# Patient Record
Sex: FEMALE | Race: WHITE | Employment: OTHER | URBAN - METROPOLITAN AREA
[De-identification: names, ages, dates, MRNs, and addresses within clinical notes are randomized per-mention and may not be internally consistent; named-entity substitution may affect disease eponyms.]

---

## 2017-01-14 ENCOUNTER — HOSPITAL ENCOUNTER (OUTPATIENT)
Age: 82
Discharge: HOME | End: 2017-01-14
Attending: EMERGENCY MEDICINE
Payer: MEDICARE

## 2017-01-14 VITALS
SYSTOLIC BLOOD PRESSURE: 176 MMHG | TEMPERATURE: 97 F | WEIGHT: 112 LBS | DIASTOLIC BLOOD PRESSURE: 107 MMHG | HEART RATE: 82 BPM | HEIGHT: 57 IN | OXYGEN SATURATION: 97 % | BODY MASS INDEX: 24.16 KG/M2 | RESPIRATION RATE: 16 BRPM

## 2017-01-14 DIAGNOSIS — R04.0 EPISTAXIS: Primary | ICD-10-CM

## 2017-01-14 PROCEDURE — 30901 CONTROL OF NOSEBLEED: CPT

## 2017-01-14 PROCEDURE — 99212 OFFICE O/P EST SF 10 MIN: CPT

## 2017-01-14 NOTE — ED PROVIDER NOTES
Ivey Goltz is a 80year old female who presents for evaluation of a nosebleed  HPI:   Pt complains of a nosebleed which she has had occurring intermittently over the last 2 days.   Patient is uncertain as to what side her nosebleed is on thinks she might GI: denies abdominal pain, vomiting   : denies dysuria  MUSCULOSKELETAL: denies back pain  NEURO: denies headaches  Skin denies painful rash    EXAM:      GENERAL: Patient is awake and alert,in no apparent distress  EYES:PERRL, conjunctiva are clear  E

## 2017-01-14 NOTE — ED INITIAL ASSESSMENT (HPI)
C/O NOSEBLEED THIS AM  S [address] 5-10 MINUTES. PATIENT CURRENTLY ON PRADAXA. NO BLEEDING CURRENTLY NOTED.

## 2017-01-15 ENCOUNTER — HOSPITAL ENCOUNTER (EMERGENCY)
Facility: HOSPITAL | Age: 82
Discharge: HOME | End: 2017-01-15
Attending: EMERGENCY MEDICINE
Payer: MEDICARE

## 2017-01-15 VITALS
RESPIRATION RATE: 16 BRPM | BODY MASS INDEX: 22 KG/M2 | WEIGHT: 100 LBS | SYSTOLIC BLOOD PRESSURE: 176 MMHG | DIASTOLIC BLOOD PRESSURE: 96 MMHG | OXYGEN SATURATION: 96 % | HEART RATE: 86 BPM | TEMPERATURE: 98 F

## 2017-01-15 DIAGNOSIS — R04.0 EPISTAXIS: Primary | ICD-10-CM

## 2017-01-15 PROCEDURE — 99283 EMERGENCY DEPT VISIT LOW MDM: CPT

## 2017-01-15 PROCEDURE — 30903 CONTROL OF NOSEBLEED: CPT

## 2017-01-15 PROCEDURE — A4216 STERILE WATER/SALINE, 10 ML: HCPCS

## 2017-01-15 RX ORDER — CEFADROXIL 500 MG/1
500 CAPSULE ORAL 2 TIMES DAILY
Qty: 10 CAPSULE | Refills: 0 | Status: SHIPPED | OUTPATIENT
Start: 2017-01-15 | End: 2017-01-20

## 2017-01-15 NOTE — ED PROVIDER NOTES
Patient Seen in: [facility] Emergency Department    History   Patient presents with:  Nose Bleed (nasopharyngeal)    Stated Complaint:     HPI    79 yo female on pradaxa with nose bleed. Was seen at [address] [place] [address] earlier and had cautery.  Bleeding started aga elements reviewed from today and agreed except as otherwise stated in HPI.     Physical Exam       ED Triage Vitals   BP 01/15/17 0219 182/82 mmHg   Pulse 01/15/17 0217 83   Resp 01/15/17 0217 16   Temp 01/15/17 0217 97.8 °F (36.6 °C)   Temp src 01/15/17 02

## 2017-01-15 NOTE — ED NOTES
Patient provided with discharge instructions, prescriptions and follow up information. Patient verbalized understanding of instructions without any questions. Patient provided with wheelchair for discharge. Daughter en route to ER to drive patient home.  Pa

## 2017-01-15 NOTE — ED INITIAL ASSESSMENT (HPI)
Pt had nose bleed starting this am, went to [facility] was cauterized and sent home. Nose bleed started again 1 hour ago. Pt is asymptomatic. On blood thinners.

## 2017-01-16 ENCOUNTER — HOSPITAL ENCOUNTER (EMERGENCY)
Facility: HOSPITAL | Age: 82
Discharge: HOME | End: 2017-01-16
Attending: EMERGENCY MEDICINE
Payer: MEDICARE

## 2017-01-16 VITALS
OXYGEN SATURATION: 98 % | RESPIRATION RATE: 17 BRPM | HEART RATE: 83 BPM | WEIGHT: 114 LBS | HEIGHT: 57 IN | BODY MASS INDEX: 24.59 KG/M2 | TEMPERATURE: 98 F | DIASTOLIC BLOOD PRESSURE: 74 MMHG | SYSTOLIC BLOOD PRESSURE: 170 MMHG

## 2017-01-16 DIAGNOSIS — R04.0 EPISTAXIS, RECURRENT: Primary | ICD-10-CM

## 2017-01-16 PROCEDURE — 99283 EMERGENCY DEPT VISIT LOW MDM: CPT

## 2017-01-16 NOTE — ED NOTES
Care assumed. Alert and interactive. Has L nare nasal rocket called PCP for removal today and referred to ED secondary to oozing.  No active bleeding at present,

## 2017-01-16 NOTE — ED NOTES
Discharged home with plan to follow up with ENT as indicated. Alert and interactive. Hemodynamically stable. No active bleeding noted. WC assist to exit.

## 2017-01-16 NOTE — ED INITIAL ASSESSMENT (HPI)
Patient states she has had nosebleed x4 days, states she had her R side cauterized 2 days ago, then at 2 am yesterday she started bleeding, +blood thinners

## 2017-01-19 NOTE — ED PROVIDER NOTES
Patient Seen in: [facility] Emergency Department    History   Patient presents with:  Nose Bleed (nasopharyngeal)    Stated Complaint: Nosebleed, persistent    HPI    80year old female with pmh HTN, hypothyroidism, CVA on Pradaxa who presents for Problem Relation Age of Onset   • Other[other] [OTHER] Father      kidney problems   • Diabetes Mother    • Heart Disorder Mother    • Cancer Mother      colon         Smoking Status: Never Smoker                      Smokeless Status: Never Used Skin: Skin is warm. She is not diaphoretic. No erythema. No pallor. Psychiatric: She has a normal mood and affect. Her behavior is normal.   Nursing note and vitals reviewed.         ED Course   Labs Reviewed - No data to display    MDM       D/w Dr Andersen Scale

## 2017-01-20 ENCOUNTER — OFFICE VISIT (OUTPATIENT)
Dept: OTOLARYNGOLOGY | Facility: CLINIC | Age: 82
End: 2017-01-20

## 2017-01-20 VITALS
DIASTOLIC BLOOD PRESSURE: 70 MMHG | SYSTOLIC BLOOD PRESSURE: 116 MMHG | WEIGHT: 114 LBS | BODY MASS INDEX: 24.59 KG/M2 | TEMPERATURE: 98 F | HEIGHT: 57 IN

## 2017-01-20 DIAGNOSIS — R04.0 EPISTAXIS: Primary | ICD-10-CM

## 2017-01-20 PROCEDURE — 30901 CONTROL OF NOSEBLEED: CPT | Performed by: OTOLARYNGOLOGY

## 2017-01-20 PROCEDURE — 99243 OFF/OP CNSLTJ NEW/EST LOW 30: CPT | Performed by: OTOLARYNGOLOGY

## 2017-01-20 NOTE — PROGRESS NOTES
Sarah Lowe is a 80year old female. Patient presents with: Follow - Up: ED follow up nose bleed- for nose packing removal today      HISTORY OF PRESENT ILLNESS    1/20/2017  Patient prevents for evaluation of nosebleeds. This is not recurrent.  Recent b wheezing. Cardio Negative Chest pain, irregular heartbeat/palpitations and syncope. GI Negative Abdominal pain and diarrhea. Endocrine Negative Cold intolerance and heat intolerance. Neuro Negative Tremors. Psych Negative Anxiety and depression. Cap, Take by mouth., Disp: , Rfl:   •  metoprolol Tartrate 25 MG Oral Tab, Take 1 tablet (25 mg total) by mouth 2 (two) times daily. , Disp: 180 tablet, Rfl: 0  •  Levothyroxine Sodium 50 MCG Oral Tab, Take 1 tablet (50 mcg total) by mouth once daily. , Disp

## 2017-01-22 ENCOUNTER — HOSPITAL ENCOUNTER (EMERGENCY)
Facility: HOSPITAL | Age: 82
Discharge: HOME | End: 2017-01-22
Attending: EMERGENCY MEDICINE
Payer: MEDICARE

## 2017-01-22 ENCOUNTER — APPOINTMENT (OUTPATIENT)
Dept: CT IMAGING | Facility: HOSPITAL | Age: 82
End: 2017-01-22
Attending: EMERGENCY MEDICINE
Payer: MEDICARE

## 2017-01-22 VITALS
WEIGHT: 112 LBS | OXYGEN SATURATION: 99 % | TEMPERATURE: 98 F | RESPIRATION RATE: 18 BRPM | HEART RATE: 82 BPM | SYSTOLIC BLOOD PRESSURE: 165 MMHG | DIASTOLIC BLOOD PRESSURE: 68 MMHG | BODY MASS INDEX: 24.16 KG/M2 | HEIGHT: 57 IN
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
## 2017-01-22 DIAGNOSIS — S09.90XA HEAD INJURY, INITIAL ENCOUNTER: ICD-10-CM

## 2017-01-22 PROCEDURE — 93010 ELECTROCARDIOGRAM REPORT: CPT | Performed by: EMERGENCY MEDICINE

## 2017-01-22 PROCEDURE — 70450 CT HEAD/BRAIN W/O DYE: CPT

## 2017-01-22 PROCEDURE — 99284 EMERGENCY DEPT VISIT MOD MDM: CPT

## 2017-01-22 PROCEDURE — 93005 ELECTROCARDIOGRAM TRACING: CPT

## 2017-01-22 NOTE — ED PROVIDER NOTES
Patient Seen in: [facility] AND [facility] Emergency Department    History   No chief complaint on file. HPI    Patient presents after falling in her bathtub last night and apparently spinning some the night in the bathtub.   She was able to climb out of the Negative for vomiting and abdominal pain. Genitourinary: Negative for dysuria and hematuria. Musculoskeletal: Negative for back pain and neck pain. Skin: Negative for rash and wound. Neurological: Negative for syncope and headaches.        Constitut 5. 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 6.  Minimal pansinusitis. 7.  No significant change from July 16, 2016.          MDM     Pulse Ox: 99%, Room air, Normal     Monitor Interpretation:   normal sinus rhythm    Differential Diagnosis: Fall, head injury, ICH,

## 2017-01-22 NOTE — ED INITIAL ASSESSMENT (HPI)
Pt fell from side of bathtub into tub last night and unknown time, was found by her daughter this AM, on Pradaxa, ambulatory at scene, denies head/neck pain, w/o lower lack pain.

## 2017-06-26 PROCEDURE — 88305 TISSUE EXAM BY PATHOLOGIST: CPT | Performed by: INTERNAL MEDICINE

## 2017-12-20 PROBLEM — I63.81 MULTIPLE LACUNAR INFARCTS (HCC): Status: ACTIVE | Noted: 2017-12-20

## 2018-01-22 ENCOUNTER — HOSPITAL ENCOUNTER (INPATIENT)
Facility: HOSPITAL | Age: 83
LOS: 2 days | Discharge: HOME HEALTH | DRG: 065 | End: 2018-01-24
Attending: EMERGENCY MEDICINE | Admitting: HOSPITALIST
Payer: MEDICARE

## 2018-01-22 ENCOUNTER — APPOINTMENT (OUTPATIENT)
Dept: GENERAL RADIOLOGY | Facility: HOSPITAL | Age: 83
DRG: 065 | End: 2018-01-22
Attending: EMERGENCY MEDICINE
Payer: MEDICARE

## 2018-01-22 ENCOUNTER — APPOINTMENT (OUTPATIENT)
Dept: CT IMAGING | Facility: HOSPITAL | Age: 83
DRG: 065 | End: 2018-01-22
Attending: EMERGENCY MEDICINE
Payer: MEDICARE

## 2018-01-22 DIAGNOSIS — I62.9 INTRACRANIAL HEMORRHAGE (HCC): Primary | ICD-10-CM

## 2018-01-22 DIAGNOSIS — I63.9 ACUTE CVA (CEREBROVASCULAR ACCIDENT) (HCC): ICD-10-CM

## 2018-01-22 DIAGNOSIS — I10 ESSENTIAL HYPERTENSION: ICD-10-CM

## 2018-01-22 LAB
ANION GAP SERPL CALC-SCNC: 10 MMOL/L (ref 0–18)
ANTIBODY SCREEN: NEGATIVE
BASOPHILS # BLD: 0 K/UL (ref 0–0.2)
BASOPHILS NFR BLD: 1 %
BILIRUB UR QL: NEGATIVE
BUN SERPL-MCNC: 25 MG/DL (ref 8–20)
BUN/CREAT SERPL: 24.5 (ref 10–20)
CALCIUM SERPL-MCNC: 9.6 MG/DL (ref 8.5–10.5)
CHLORIDE SERPL-SCNC: 102 MMOL/L (ref 95–110)
CLARITY UR: CLEAR
CO2 SERPL-SCNC: 28 MMOL/L (ref 22–32)
COLOR UR: YELLOW
CREAT SERPL-MCNC: 1.02 MG/DL (ref 0.5–1.5)
EOSINOPHIL # BLD: 0.2 K/UL (ref 0–0.7)
EOSINOPHIL NFR BLD: 3 %
ERYTHROCYTE [DISTWIDTH] IN BLOOD BY AUTOMATED COUNT: 13.3 % (ref 11–15)
GLUCOSE SERPL-MCNC: 125 MG/DL (ref 70–99)
GLUCOSE UR-MCNC: NEGATIVE MG/DL
HCT VFR BLD AUTO: 42.9 % (ref 35–48)
HGB BLD-MCNC: 14.2 G/DL (ref 12–16)
KETONES UR-MCNC: NEGATIVE MG/DL
LEUKOCYTE ESTERASE UR QL STRIP.AUTO: NEGATIVE
LYMPHOCYTES # BLD: 1.4 K/UL (ref 1–4)
LYMPHOCYTES NFR BLD: 20 %
MCH RBC QN AUTO: 32 PG (ref 27–32)
MCHC RBC AUTO-ENTMCNC: 33.2 G/DL (ref 32–37)
MCV RBC AUTO: 96.4 FL (ref 80–100)
MONOCYTES # BLD: 0.7 K/UL (ref 0–1)
MONOCYTES NFR BLD: 9 %
MRSA DNA SPEC QL NAA+PROBE: NEGATIVE
NEUTROPHILS # BLD AUTO: 4.7 K/UL (ref 1.8–7.7)
NEUTROPHILS NFR BLD: 67 %
NITRITE UR QL STRIP.AUTO: NEGATIVE
OSMOLALITY UR CALC.SUM OF ELEC: 296 MOSM/KG (ref 275–295)
PH UR: 7 [PH] (ref 5–8)
PLATELET # BLD AUTO: 175 K/UL (ref 140–400)
PMV BLD AUTO: 9.8 FL (ref 7.4–10.3)
POTASSIUM SERPL-SCNC: 4.3 MMOL/L (ref 3.3–5.1)
PROT UR-MCNC: NEGATIVE MG/DL
RBC # BLD AUTO: 4.45 M/UL (ref 3.7–5.4)
RBC #/AREA URNS AUTO: 1 /HPF
RH BLOOD TYPE: POSITIVE
SODIUM SERPL-SCNC: 140 MMOL/L (ref 136–144)
SP GR UR STRIP: 1 (ref 1–1.03)
UROBILINOGEN UR STRIP-ACNC: <2
VIT C UR-MCNC: NEGATIVE MG/DL
WBC # BLD AUTO: 7 K/UL (ref 4–11)
WBC #/AREA URNS AUTO: 2 /HPF

## 2018-01-22 PROCEDURE — 71045 X-RAY EXAM CHEST 1 VIEW: CPT | Performed by: EMERGENCY MEDICINE

## 2018-01-22 PROCEDURE — 70450 CT HEAD/BRAIN W/O DYE: CPT | Performed by: EMERGENCY MEDICINE

## 2018-01-22 RX ORDER — SODIUM CHLORIDE 0.9 % (FLUSH) 0.9 %
3 SYRINGE (ML) INJECTION AS NEEDED
Status: DISCONTINUED | OUTPATIENT
Start: 2018-01-22 | End: 2018-01-24

## 2018-01-22 RX ORDER — ONDANSETRON 2 MG/ML
4 INJECTION INTRAMUSCULAR; INTRAVENOUS EVERY 6 HOURS PRN
Status: DISCONTINUED | OUTPATIENT
Start: 2018-01-22 | End: 2018-01-24

## 2018-01-22 RX ORDER — ACETAMINOPHEN 325 MG/1
650 TABLET ORAL EVERY 6 HOURS PRN
Status: DISCONTINUED | OUTPATIENT
Start: 2018-01-22 | End: 2018-01-24

## 2018-01-22 NOTE — ED INITIAL ASSESSMENT (HPI)
Patient presents to ER via medics after patient had slurred speech 30 mins PTA - resolved now. Hx TIA. Patient also reports dizziness.

## 2018-01-22 NOTE — PROGRESS NOTES
01/22/18 1652   Clinical Encounter Type   Visited With Family   Routine Visit Introduction   Continue Visiting Yes   Crisis Visit ED   Patient Spiritual Encounters   Spiritual Assessment Completed 2   Family Spiritual Encounters   Family Coping Open/dis

## 2018-01-22 NOTE — PROGRESS NOTES
ASSESSMENT/PLAN:    Impression: 1. Acute hemorrhagic CVA in a 80year-old woman on atrial fibrillation. 2.  Hypertension on metoprolol. 3.  Xarelto: It is unclear why she is on this.     Recommendation: I would stop her Xarelto and all other anticoagu 180 tablet Rfl: 1   Turmeric 450 MG Oral Cap Take by mouth. Disp:  Rfl:    Levothyroxine Sodium 50 MCG Oral Tab Take 1 tablet (50 mcg total) by mouth once daily.  Disp: 90 tablet Rfl: 1   MAGNESIUM 500 MG OR CAPS 1 DAILY Disp:  Rfl:    VITAMIN D 1000 UNIT O SKIN:no rashes,lesions. NEURO/PSYCH:alert and oriented. Normal affect. CV: PALP:PMI not displaced; no lifts, thrills or rubs. AUSC: Irregularly irregular rhythm, normal S1, S2 without S3; no murmur.  CAROTIDS:carotid pulses normal. ABD AORTA:not enlarged

## 2018-01-22 NOTE — ED PROVIDER NOTES
Patient Seen in: [facility] AND [facility] Emergency Department    History   Patient presents with:  Dizziness (neurologic)  Altered Mental Status (neurologic)    Stated Complaint: slurred speech resolved 30 mins PTA; dizziness    HPI    80year old female with Lb        Smoking status: Never Smoker                                                              Smokeless tobacco: Never Used                      Alcohol use:  No                Review of Systems    Positive for stated complaint: slurred speech reso visual field defects. Skin: Skin is warm and dry. No rash noted. She is not diaphoretic. Psychiatric: She has a normal mood and affect. Her speech is normal and behavior is normal.   Nursing note and vitals reviewed.         ED Course     Labs Review intervals and axes as noted on EKG Report.   Rate: 75  Rhythm: Sinus Rhythm  Reading: NSR           ED Course as of Jan 22 1716  ------------------------------------------------------------       MDM       Pulse Ox: 94%, Normal, RA    Cardiac Monitor: Pulse interpreting tests and discussion with consultants but not including time spent performing procedures.     Dr Cesario Alberto - will admit, req cardiology and neurology consult  Dr Elmira Parada - will consult, agrees with bp control, admit ICU for neurochecks, hold xarelt

## 2018-01-23 ENCOUNTER — APPOINTMENT (OUTPATIENT)
Dept: MRI IMAGING | Facility: HOSPITAL | Age: 83
DRG: 065 | End: 2018-01-23
Attending: Other
Payer: MEDICARE

## 2018-01-23 ENCOUNTER — APPOINTMENT (OUTPATIENT)
Dept: ULTRASOUND IMAGING | Facility: HOSPITAL | Age: 83
DRG: 065 | End: 2018-01-23
Attending: Other
Payer: MEDICARE

## 2018-01-23 ENCOUNTER — APPOINTMENT (OUTPATIENT)
Dept: CV DIAGNOSTICS | Facility: HOSPITAL | Age: 83
DRG: 065 | End: 2018-01-23
Attending: INTERNAL MEDICINE
Payer: MEDICARE

## 2018-01-23 LAB
ANION GAP SERPL CALC-SCNC: 6 MMOL/L (ref 0–18)
BASOPHILS # BLD: 0.1 K/UL (ref 0–0.2)
BASOPHILS NFR BLD: 1 %
BUN SERPL-MCNC: 23 MG/DL (ref 8–20)
BUN/CREAT SERPL: 25.8 (ref 10–20)
CALCIUM SERPL-MCNC: 8.8 MG/DL (ref 8.5–10.5)
CHLORIDE SERPL-SCNC: 106 MMOL/L (ref 95–110)
CHOLEST SERPL-MCNC: 182 MG/DL (ref 110–200)
CO2 SERPL-SCNC: 26 MMOL/L (ref 22–32)
CREAT SERPL-MCNC: 0.89 MG/DL (ref 0.5–1.5)
EOSINOPHIL # BLD: 0.3 K/UL (ref 0–0.7)
EOSINOPHIL NFR BLD: 4 %
ERYTHROCYTE [DISTWIDTH] IN BLOOD BY AUTOMATED COUNT: 13.4 % (ref 11–15)
GLUCOSE SERPL-MCNC: 116 MG/DL (ref 70–99)
HCT VFR BLD AUTO: 42.2 % (ref 35–48)
HDLC SERPL-MCNC: 56 MG/DL
HGB BLD-MCNC: 14.1 G/DL (ref 12–16)
LDLC SERPL CALC-MCNC: 105 MG/DL (ref 0–99)
LYMPHOCYTES # BLD: 1.6 K/UL (ref 1–4)
LYMPHOCYTES NFR BLD: 19 %
MCH RBC QN AUTO: 32 PG (ref 27–32)
MCHC RBC AUTO-ENTMCNC: 33.5 G/DL (ref 32–37)
MCV RBC AUTO: 95.6 FL (ref 80–100)
MONOCYTES # BLD: 0.8 K/UL (ref 0–1)
MONOCYTES NFR BLD: 9 %
NEUTROPHILS # BLD AUTO: 5.7 K/UL (ref 1.8–7.7)
NEUTROPHILS NFR BLD: 67 %
NONHDLC SERPL-MCNC: 126 MG/DL
OSMOLALITY UR CALC.SUM OF ELEC: 291 MOSM/KG (ref 275–295)
PLATELET # BLD AUTO: 189 K/UL (ref 140–400)
PMV BLD AUTO: 10.2 FL (ref 7.4–10.3)
POTASSIUM SERPL-SCNC: 3.5 MMOL/L (ref 3.3–5.1)
POTASSIUM SERPL-SCNC: 5.5 MMOL/L (ref 3.3–5.1)
RBC # BLD AUTO: 4.41 M/UL (ref 3.7–5.4)
SODIUM SERPL-SCNC: 138 MMOL/L (ref 136–144)
TRIGL SERPL-MCNC: 103 MG/DL (ref 1–149)
WBC # BLD AUTO: 8.5 K/UL (ref 4–11)

## 2018-01-23 PROCEDURE — 95816 EEG AWAKE AND DROWSY: CPT | Performed by: OTHER

## 2018-01-23 PROCEDURE — 4A00X4Z MEASUREMENT OF CENTRAL NERVOUS ELECTRICAL ACTIVITY, EXTERNAL APPROACH: ICD-10-PCS | Performed by: INTERNAL MEDICINE

## 2018-01-23 PROCEDURE — 93880 EXTRACRANIAL BILAT STUDY: CPT | Performed by: OTHER

## 2018-01-23 PROCEDURE — 70551 MRI BRAIN STEM W/O DYE: CPT | Performed by: OTHER

## 2018-01-23 PROCEDURE — 93306 TTE W/DOPPLER COMPLETE: CPT | Performed by: INTERNAL MEDICINE

## 2018-01-23 PROCEDURE — 99223 1ST HOSP IP/OBS HIGH 75: CPT | Performed by: OTHER

## 2018-01-23 RX ORDER — ARIPIPRAZOLE 15 MG/1
40 TABLET ORAL EVERY 4 HOURS
Status: COMPLETED | OUTPATIENT
Start: 2018-01-23 | End: 2018-01-23

## 2018-01-23 RX ORDER — HYDRALAZINE HYDROCHLORIDE 25 MG/1
25 TABLET, FILM COATED ORAL EVERY 6 HOURS PRN
Status: DISCONTINUED | OUTPATIENT
Start: 2018-01-23 | End: 2018-01-24

## 2018-01-23 RX ORDER — LEVETIRACETAM 250 MG/1
250 TABLET ORAL 2 TIMES DAILY
Status: DISCONTINUED | OUTPATIENT
Start: 2018-01-23 | End: 2018-01-24

## 2018-01-23 RX ORDER — AMLODIPINE BESYLATE 5 MG/1
5 TABLET ORAL DAILY
Status: DISCONTINUED | OUTPATIENT
Start: 2018-01-23 | End: 2018-01-24

## 2018-01-23 NOTE — PROGRESS NOTES
.maria g     ASSESSMENT/PLAN:    Impression:   1. Acute hemorrhagic CVA in a 80year-old woman on xarelto; doing well  2. Hypertension on metoprolol. off cardene drip  3. Xarelto: It is unclear why she is on this.   daughter notes it was started for recurre except as in HPI. EXAM:   /71 (BP Location: Right arm)   Pulse 82   Temp 98.2 °F (36.8 °C) (Temporal)   Resp 16   Ht 5' (1.524 m)   Wt 124 lb (56.2 kg)   SpO2 93%   BMI 24.22 kg/m²   CONS: well developed, well nourished. WEIGHT:discussed.  HEAD/FACE:

## 2018-01-23 NOTE — PHYSICAL THERAPY NOTE
PHYSICAL THERAPY EVALUATION - INPATIENT     Room Number: 230/230-A  Evaluation Date: 1/23/2018  Type of Evaluation: Initial  Physician Order: PT Eval and Treat    Presenting Problem: ICH  Reason for Therapy: Mobility Dysfunction and Discharge Planning 3x/week       PHYSICAL THERAPY MEDICAL/SOCIAL HISTORY   Problem List  Principal Problem:    Intracranial hemorrhage [facility])  Active Problems:    Essential hypertension      Past Medical History  Past Medical History:   Diagnosis Date   • Chronic rhinitis difficulty does the patient currently have. ..  -   Turning over in bed (including adjusting bedclothes, sheets and blankets)?: A Little   -   Sitting down on and standing up from a chair with arms (e.g., wheelchair, bedside commode, etc.): A Little   -   M negotiate 5 stairs/one curb w/ assistive device and supervision   Goal #4   Current Status    Goal #5 Patient to demonstrate independence with home activity/exercise instructions provided to patient in preparation for discharge.    Goal #5   Current Status

## 2018-01-23 NOTE — SLP NOTE
SPEECH/LANGUAGE/COGNITIVE EVALUATION - INPATIENT    Admission Date: 1/22/2018  Evaluation Date: 01/23/18    Reason for Referral: Stroke protocol    ASSESSMENT & PLAN   ASSESSMENT & IMPRESSION  Pt seen sitting upright in chair for session.  Pt with fluent ve and plan of treatment and have been advised and agree on the findings and goals. FOLLOW UP  Treatment Plan/Recommendations: Aspiration precautions; Aphasia therapy  Number of Visits to Meet Established Goals: 5  Follow Up Needed: Yes  SLP Follow-up [place]

## 2018-01-23 NOTE — ED NOTES
Patient sitting up in chair next to bed, food and drink provided to patient. Family at bedside. Patient also ambulated with cane and standby to bathroom steadily.

## 2018-01-23 NOTE — SLP NOTE
ADULT SWALLOWING EVALUATION    ASSESSMENT    ASSESSMENT/OVERALL IMPRESSION:  Pt seen sitting upright in chair for all PO trials for BSSE. Pt's daughter present for session and assisted in history as necessary.  Pt with R sided facial weakness during labial • STROKE     transient ischemic attacks   • Stroke [facility])    • TIA (transient ischemic attack)    • Unspecified essential hypertension        Prior Living Situation:  (part-time caregiver)  Diet Prior to Admission: Regular; Thin liquids  Precautions: None time across 2 sessions. In Progress     FOLLOW UP  Treatment Plan/Recommendations: Aspiration precautions; Aphasia therapy  Number of Visits to Meet Established Goals: 5  Follow Up Needed: Yes  SLP Follow-up Date: 01/24/18    Thank you for your referral.

## 2018-01-23 NOTE — OCCUPATIONAL THERAPY NOTE
OCCUPATIONAL THERAPY EVALUATION - INPATIENT     Room Number: 230/230-A  Evaluation Date: 1/23/2018  Type of Evaluation: Initial  Presenting Problem: Acute CVA    Physician Order: IP Consult to Occupational Therapy  Reason for Therapy: ADL/IADL Dysfunction assistance)  OT Device Recommendations: TBD    PLAN  OT Treatment Plan: UE strengthening/ROM; Endurance training;IADL training;ADL training;Balance activities; Functional transfer training;Patient/Family education         OCCUPATIONAL THERAPY MEDICAL/SOCIAL breath    COGNITION  Orientation Level:  oriented to place, oriented to time and oriented to person  Following Commands:  follows one step commands with increased time  Problem Solving:  assistance required to identify errors made, assistance required to g fair+  Static Standing: fair-  Dynamic Standing: poor+    FUNCTIONAL ADL ASSESSMENT  Grooming: SBA and increased time  Feeding: increased time/effort  Bathing: min A  Toileting: min A  Upper Extremity Dressing: min A  Lower Extremity Dressing: min A      E

## 2018-01-23 NOTE — PROGRESS NOTES
[facility] Hospitalist Progress Note     PCP: Jose Enrique Holly MD    CC: Follow up       Assessment/Plan:       Ms. Alex Soriano is a 81 y/o F w/ HTN, previous hx of TIA on xarelto currently, ?afib, HL presented w/ acute CVA sxs, found to have intracranial hemorrhage 91  Resp:  [12-23] 17  BP: ()/() 148/100    Intake/Output:    Intake/Output Summary (Last 24 hours) at 01/23/18 0952  Last data filed at 01/23/18 0900   Gross per 24 hour   Intake           170.83 ml   Output              300 ml   Net July 16, 2016. 2. Borderline cardiomegaly. 3. Atherosclerosis. 4. Hyperinflation. 5. Scarring/atelectasis. 6. Scoliosis. 7. Demineralization. 8. Osteoarthritis. 9. Old fracture left humerus.         Ct Stroke Brain (no Iv)(cpt=70450)    Result Date: 1/22/20

## 2018-01-23 NOTE — PLAN OF CARE
Patient Centered Care    • Patient preferences are identified and integrated in the patient's plan of care Progressing          Patient/Family Goals    • Patient/Family Long Term Goal Progressing    • Patient/Family Short Term Goal Progressing        Shaila

## 2018-01-23 NOTE — H&P
[facility] Hospitalist Team  History and Physical     ASSESSMENT / PLAN:     81 y/o F w/ HTN, previous hx of TIA on xarelto currently, ?afib, HL presented w/ acute CVA sxs, found to have intracranial hemorrhage    Intracranial hemorrhage  - presented w/ expressiv which care giver thinks is about 20 mins from onset of sxs, most of her sxs have resolved. Pt was able to talk and strength returned. No fever. No falls.     OBJECTIVE:  /77   Pulse 78   Temp 97.8 °F (36.6 °C) (Temporal)   Resp 14   Ht 5' (1.524 m) HPI.      DIAGNOSTIC DATA:   CBC/Chem  Recent Labs   Lab  01/22/18   1513   WBC  7.0   HGB  14.2   MCV  96.4   PLT  175       Recent Labs   Lab  01/22/18   1513   NA  140   K  4.3   CL  102   CO2  28   BUN  25*   CREATSERUM  1.02   GLU  125*   CA  9.6

## 2018-01-23 NOTE — CONSULTS
[facility]    Report of Consultation    Ramana Jose Patient Status:  Inpatient    1923 MRN [id number]   Location [facility] 2W/SW Attending Steward Kussmaul, MD   Hosp Day # 1 PCP Juan Goodrich MD     Date of Admission: • Other[other] [OTHER] Father      kidney problems   • Diabetes Mother    • Heart Disorder Mother    • Cancer Mother      colon       Social History  Patient Guardian Status:  Not on file.     Other Topics            Concern    None on file    Social Hist no cyanosis or edema     Neurological:     Mental Status- Alert and oriented x3.   Normal attention span and concentration  Thought process intact  Memory intact- recent and remote  Mood intact  Fund of knowledge appropriate for education and age    Farideh Flores frontal lobe, possibly intracranial hemorrhage. Some old ischemic stroke as well. Impression:     Intracranial hemorrhage (HCC)  Spontaneous hemorrhage. It seems to be mostly in the cortex or juxtacortical position.   Possibly amyloid angiopathy vers

## 2018-01-23 NOTE — ED NOTES
Patient ambulated to bathroom with cane and standby assist at a steady gait. Comfort measures provided. Call light at reach. Family at bedside.

## 2018-01-23 NOTE — CONSULTS
Pulmonary H&P/Consult     NAME: Genoveva Gutierrez - ROOM: 230/230-A - MRN: [id number] - Age: 80year old - :  1923    Date of Admission: 2018  2:53 PM  Admission Diagnosis: Intracranial hemorrhage ([facility] [address] [I62.9]  Essential hypertension [I10]  Acute Medications:  • metoprolol Tartrate  25 mg Oral 2x Daily(Beta Blocker)   • AmLODIPine Besylate  5 mg Oral Daily     • NiCARdipine Stopped (01/23/18 7640)     ALLERGIES:   Aspirin                     REVIEW OF SYSTEMS: 10 systems reviewed, negative exce

## 2018-01-23 NOTE — PROCEDURES
EEG report    REFERRING PHYSICIAN: Dave Gilman MD  PCP and phone number:  Sarah Beth Gibbs MD  [phone]    TECHNIQUE: 21 channels of EEG, 2 channels of EOG, and 1 channel of EKG were recorded utilizing the International 10/20 System.  The r

## 2018-01-24 VITALS
RESPIRATION RATE: 16 BRPM | SYSTOLIC BLOOD PRESSURE: 139 MMHG | WEIGHT: 122.38 LBS | TEMPERATURE: 97 F | HEART RATE: 67 BPM | OXYGEN SATURATION: 96 % | HEIGHT: 60 IN | BODY MASS INDEX: 24.03 KG/M2 | DIASTOLIC BLOOD PRESSURE: 57 MMHG

## 2018-01-24 LAB
ANION GAP SERPL CALC-SCNC: 5 MMOL/L (ref 0–18)
BUN SERPL-MCNC: 27 MG/DL (ref 8–20)
BUN/CREAT SERPL: 29 (ref 10–20)
CALCIUM SERPL-MCNC: 8.6 MG/DL (ref 8.5–10.5)
CHLORIDE SERPL-SCNC: 108 MMOL/L (ref 95–110)
CO2 SERPL-SCNC: 24 MMOL/L (ref 22–32)
CREAT SERPL-MCNC: 0.93 MG/DL (ref 0.5–1.5)
GLUCOSE SERPL-MCNC: 103 MG/DL (ref 70–99)
OSMOLALITY UR CALC.SUM OF ELEC: 289 MOSM/KG (ref 275–295)
POTASSIUM SERPL-SCNC: 4.4 MMOL/L (ref 3.3–5.1)
SODIUM SERPL-SCNC: 137 MMOL/L (ref 136–144)

## 2018-01-24 PROCEDURE — 99232 SBSQ HOSP IP/OBS MODERATE 35: CPT | Performed by: OTHER

## 2018-01-24 RX ORDER — LEVETIRACETAM 250 MG/1
250 TABLET ORAL 2 TIMES DAILY
Qty: 60 TABLET | Refills: 0 | Status: SHIPPED | OUTPATIENT
Start: 2018-01-24 | End: 2018-02-02

## 2018-01-24 RX ORDER — AMLODIPINE BESYLATE 5 MG/1
5 TABLET ORAL DAILY
Qty: 30 TABLET | Refills: 0 | Status: SHIPPED | OUTPATIENT
Start: 2018-01-25 | End: 2018-02-22

## 2018-01-24 NOTE — SLP NOTE
SPEECH DAILY NOTE - INPATIENT    ASSESSMENT & PLAN   ASSESSMENT  Pt was pleasant and alert throughout the session. Pt willingly participated in all treatment activities.  Pt engaged in a categorical confrontational naming exercise, in which the pt provided Tasks with mild cues with 100 % accuracy within 3 session(s). Treatment goal not targeted during session. In Progress   Goal #2 The patient will complete Responsve naming tasks with mild cues with 100 % accuracy within 3 session(s).      Pt completed

## 2018-01-24 NOTE — FACE TO FACE NOTE
[facility]  Face to Face Encounter Note    Diana Smith Patient Status:  Inpatient    1923 MRN [id number]   Location [facility] 3W/SW Attending Slade Rivas MD   Hosp Day # 2 PCP Wanetta Lefort, MD       I, or a non-physician pr occupational therapy. The patient is under my care, and I have initiated the establishment of the plan of care. This physician will be followed by a physician who will periodically review the plan of care.   The findings from this face-to-face encounter h

## 2018-01-24 NOTE — PAYOR COMM NOTE
Admit Orders     Start     Ordered    01/22/18 2158  Admit to inpatient Once  ([address])  Once     Ordering Provider:  Doron Khoury MD   Question Answer Comment   Admitting Physician [facility]    Diagnosis Intracranial hemorrh speech, on further testing appeared to have right-sided weakness and left facial droop, daughter spoke to patient on phone and agreed her speech seemed garbled.   The patient was following directions at this time did not appear to have any seizure-like acti (36.6 °C) (Temporal)   Resp 14   Ht 152.4 cm (5')   Wt 59.6 kg   SpO2 95%   BMI 25.66 kg/m²[VR.1]         Physical Exam   Constitutional: She is oriented to person, place, and time. She appears well-developed and well-nourished. No distress.    HENT:   Head Few (*)     All other components within normal limits   CBC WITH DIFFERENTIAL WITH PLATELET    Narrative: The following orders were created for panel order CBC WITH DIFFERENTIAL WITH PLATELET.   Procedure                               Abnormality Demineralization. 8. Osteoarthritis. 9. Old fracture left humerus. Ct Stroke Brain (no Iv)(cpt=70450)    Result Date: 1/22/2018  CONCLUSION:  1. Small 4 mm hyperdensity in the left frontal lobe (inferior frontal gyrus), new since 1/22/2017.  This is evaluation, hold Xarelto, admit to the ICU for neuro checks. The patient was started on a Cardene drip to control her blood pressure, required only minimal dosing for adequate blood pressure control with goal SBP less than 160. [VR.2]    Medications   Lionel TIA on xarelto currently, ?afib, HL presented w/ acute CVA[KW.1] sxs, found to have intracranial hemorrhage    Intracranial hemorrhage[KW.2]  - presented w/ expressive aphasia, right arm weakness and left facial weakness x 20 mins  - small 4 mm, left front resolved. Pt was able to talk and strength returned. No fever. No falls.     OBJECTIVE:  /77   Pulse 78   Temp 97.8 °F (36.6 °C) (Temporal)   Resp 14   Ht 5' (1.524 m)   Wt 131 lb 6.3 oz (59.6 kg)   SpO2 94%   BMI 25.66 kg/m²      GENERAL: no apparent 7. 0   HGB  14.2   MCV  96.4   PLT  175       Recent Labs   Lab  01/22/18   1513   NA  140   K  4.3   CL  102   CO2  28   BUN  25*   CREATSERUM  1.02   GLU  125*   CA  9.6       No results for input(s): ALT, AST, ALB, AMYLASE, LIPASE, LDH in the last 72 minal DAY:  AmLODIPine Besylate (NORVASC) tab 5 mg     Date Action Dose Route User    1/24/2018 1017 Given 5 mg Oral Trevin Nolen RN      levETIRAcetam (KEPPRA) tab 250 mg     Date Action Dose Route User    1/24/2018 1017 Given 250 mg Oral Equilla Leisure (Temporal)   Resp 16   Ht 5' (1.524 m)   Wt 124 lb (56.2 kg)   SpO2 93%   BMI 24.22 kg/m²   CONS: well developed, well nourished. WEIGHT:discussed. HEAD/FACE:no trauma, normocephalic. EYES:conjunctiva not injected, no xanthelasma.  ENT:mucosa pink and moist Medicine    1/23 NEURO CONSULT NOTE     Hosp Day # 1 PCP Esthela Erickson MD      Date of Admission:  1/22/2018  Date of Consult:  1/23/2018  Reason for Consultation:   Episodes of slurred speech and possible weakness, and intracranial hemorrhage     His appropriate for education and age     Language intact including: comprehension, naming, repetition, vocabulary     Cranial Nerves:  II.- Visual fields full to confrontation        Fundoscopically- No papilledema or retinal hemorrhages.  Normal optic discs, establish the size of the stroke and make sure that it has not changed. Patient clinically is doing much better. LDL was 105, however we will not start her on a statin at this point.   Due to possible increase of hemorrhagic strokes with a statin.     Ramesh Paz Rosas MD      Subjective:  Margret Gonzalez is a(n) 80year old female.  [place] course to date: Patient is doing quite well, no headaches, no problems with speech or language.   No new numbness or weakness.     Neurological:      Mental Status- Alert anticoagulation. MRI of the brain reveals actually multiple microhemorrhages, most of them are either cortical or juxtacortical that raises the possibility of a probable cerebral amyloid angiopathy.   Which will be by itself a contraindication to anticoagu

## 2018-01-24 NOTE — PLAN OF CARE
CARDIOVASCULAR - ADULT    • Maintains optimal cardiac output and hemodynamic stability Progressing    • Absence of cardiac arrhythmias or at baseline Progressing    BP controlled on oral medications. Cardiac monitor reveals NSR.     NEUROLOGICAL - ADULT

## 2018-01-24 NOTE — DISCHARGE PLANNING
Pt is a/o, denies pain, denies SOB. To be discharged today with home health. Went over discharge instructions. Went over medications and side effects with patient and family. D/C IV, site CDI, D/C tele. Assured patient had belongings from home.  Questions f

## 2018-01-24 NOTE — CM/SW NOTE
DAVIAN spoke with the dtr Andrew Weller for initial assessment. Address and phone confirmed as listed on the facesheet. Pt has a private pay caregiver Shoshana Pleitez for 6 hours/day 7 days/wk. Pt is ambulatory with a cane, has a shower chair. No breathing equipment.     Pt has

## 2018-01-24 NOTE — PROGRESS NOTES
.maria g     ASSESSMENT/PLAN:    Impression:   1. Acute hemorrhagic CVA in a 80year-old woman on xarelto; doing well; mri and carotids reviewed  2. Hypertension on metoprolol. Controlled on po meds  3. Xarelto: It is unclear why she is on this.   daughter Pulse 69   Temp 97.7 °F (36.5 °C) (Temporal)   Resp 14   Ht 5' (1.524 m)   Wt 122 lb 6.4 oz (55.5 kg)   SpO2 96%   BMI 23.90 kg/m²   CONS: well developed, well nourished. WEIGHT:discussed. HEAD/FACE:no trauma, normocephalic.  EYES:conjunctiva not injected,

## 2018-01-24 NOTE — HOME CARE LIAISON
DIAGNOSES AND PERTINENT MEDICAL HISTORY: INTRACRANIAL HEMORRHAGE     FACILITY NAME AND DC DATE: [address] [place] [address] 1/24/18    BEDSIDE VISIT WITH: SPOKE WITH DAUGHTER TSERING VIA PHONE TO DISCUSS  SERVICES    SERVICES ORDERED: RN PT OT ST    VERIFIED PATIENT ADDRESS, P

## 2018-01-24 NOTE — SLP NOTE
SPEECH DAILY NOTE - INPATIENT    ASSESSMENT & PLAN   ASSESSMENT  Pt seen sitting upright in chair. Pt was alert and pleasant throughout the session. Pt seen with items on breakfast tray, which included trials of puree and thin liquids via straw.  Oral phase strategies and demonstrated strategies.    In Progress   Goal #3 The patient will utilize compensatory strategies as outlined by  BSSE (clinical evaluation) including Slow rate, Small bites, Small sips, Multiple swallows, Upright 90 degrees, Eliminate distr

## 2018-01-24 NOTE — PROGRESS NOTES
[facility]  Neurology Progress Note    Makenzie Garica Patient Status:  Inpatient    1923 MRN [id number]   Location [facility] 2W/SW Attending Walt Chanel MD   Hosp Day # 2 PCP Gui Hill MD     Subjective:  Makenzie Garcia is confrontation        Fundoscopically- No papilledema or retinal hemorrhages. Normal optic discs, sharp edges. III, IV, VI- EOM intact, WOODY  V. Facial sensation intact  VII. Face symmetric, no facial weakness  VIII.  Hearing intact to whisper, tuning fork patient was started on a small dose of Keppra. In 6 months we can consider discontinuing it after repeating an EEG again.     Santos Kirstie  1/24/2018  10:28 AM

## 2018-01-25 ENCOUNTER — HOSPITAL ENCOUNTER (EMERGENCY)
Facility: HOSPITAL | Age: 83
Discharge: HOME | End: 2018-01-25
Attending: EMERGENCY MEDICINE
Payer: MEDICARE

## 2018-01-25 ENCOUNTER — APPOINTMENT (OUTPATIENT)
Dept: CT IMAGING | Facility: HOSPITAL | Age: 83
End: 2018-01-25
Payer: MEDICARE

## 2018-01-25 VITALS
RESPIRATION RATE: 18 BRPM | DIASTOLIC BLOOD PRESSURE: 66 MMHG | HEART RATE: 85 BPM | WEIGHT: 123 LBS | TEMPERATURE: 98 F | BODY MASS INDEX: 24 KG/M2 | OXYGEN SATURATION: 96 % | SYSTOLIC BLOOD PRESSURE: 127 MMHG

## 2018-01-25 DIAGNOSIS — I62.9 INTRACRANIAL HEMORRHAGE (HCC): ICD-10-CM

## 2018-01-25 DIAGNOSIS — G40.909 SEIZURE DISORDER (HCC): Primary | ICD-10-CM

## 2018-01-25 LAB
ANION GAP SERPL CALC-SCNC: 9 MMOL/L (ref 0–18)
BASOPHILS # BLD: 0.1 K/UL (ref 0–0.2)
BASOPHILS NFR BLD: 1 %
BUN SERPL-MCNC: 31 MG/DL (ref 8–20)
BUN/CREAT SERPL: 26.5 (ref 10–20)
CALCIUM SERPL-MCNC: 8.8 MG/DL (ref 8.5–10.5)
CHLORIDE SERPL-SCNC: 105 MMOL/L (ref 95–110)
CO2 SERPL-SCNC: 25 MMOL/L (ref 22–32)
CREAT SERPL-MCNC: 1.17 MG/DL (ref 0.5–1.5)
EOSINOPHIL # BLD: 0.3 K/UL (ref 0–0.7)
EOSINOPHIL NFR BLD: 4 %
ERYTHROCYTE [DISTWIDTH] IN BLOOD BY AUTOMATED COUNT: 13.4 % (ref 11–15)
GLUCOSE BLDC GLUCOMTR-MCNC: 95 MG/DL (ref 70–99)
GLUCOSE SERPL-MCNC: 114 MG/DL (ref 70–99)
HCT VFR BLD AUTO: 43 % (ref 35–48)
HGB BLD-MCNC: 14.3 G/DL (ref 12–16)
LYMPHOCYTES # BLD: 1.6 K/UL (ref 1–4)
LYMPHOCYTES NFR BLD: 22 %
MCH RBC QN AUTO: 31.7 PG (ref 27–32)
MCHC RBC AUTO-ENTMCNC: 33.2 G/DL (ref 32–37)
MCV RBC AUTO: 95.6 FL (ref 80–100)
MONOCYTES # BLD: 0.6 K/UL (ref 0–1)
MONOCYTES NFR BLD: 8 %
NEUTROPHILS # BLD AUTO: 4.8 K/UL (ref 1.8–7.7)
NEUTROPHILS NFR BLD: 65 %
OSMOLALITY UR CALC.SUM OF ELEC: 295 MOSM/KG (ref 275–295)
PLATELET # BLD AUTO: 187 K/UL (ref 140–400)
PMV BLD AUTO: 9.9 FL (ref 7.4–10.3)
POTASSIUM SERPL-SCNC: 4.1 MMOL/L (ref 3.3–5.1)
RBC # BLD AUTO: 4.5 M/UL (ref 3.7–5.4)
SODIUM SERPL-SCNC: 139 MMOL/L (ref 136–144)
WBC # BLD AUTO: 7.3 K/UL (ref 4–11)

## 2018-01-25 PROCEDURE — 85025 COMPLETE CBC W/AUTO DIFF WBC: CPT | Performed by: EMERGENCY MEDICINE

## 2018-01-25 PROCEDURE — 70450 CT HEAD/BRAIN W/O DYE: CPT | Performed by: EMERGENCY MEDICINE

## 2018-01-25 PROCEDURE — 93010 ELECTROCARDIOGRAM REPORT: CPT | Performed by: EMERGENCY MEDICINE

## 2018-01-25 PROCEDURE — 82962 GLUCOSE BLOOD TEST: CPT

## 2018-01-25 PROCEDURE — 36415 COLL VENOUS BLD VENIPUNCTURE: CPT

## 2018-01-25 PROCEDURE — 80048 BASIC METABOLIC PNL TOTAL CA: CPT | Performed by: EMERGENCY MEDICINE

## 2018-01-25 PROCEDURE — 93005 ELECTROCARDIOGRAM TRACING: CPT

## 2018-01-25 PROCEDURE — 99285 EMERGENCY DEPT VISIT HI MDM: CPT

## 2018-01-25 RX ORDER — LEVETIRACETAM 500 MG/1
500 TABLET ORAL ONCE
Status: COMPLETED | OUTPATIENT
Start: 2018-01-25 | End: 2018-01-25

## 2018-01-25 NOTE — ED NOTES
Care assumed on return from CT scan Caregiver reports that pt was up to bathroom and approximately five minutes later @1030 had an acute onset R facial droop RUE drift and expressive aphasia with return of speech @1045 On arrival to room pt with NIH score

## 2018-01-25 NOTE — ED INITIAL ASSESSMENT (HPI)
From home with caregiver, c/o R sided facial droop and difficulty speaking + drooling around 1030 a.m. Pt is back to normal upon arrival to hospital. Symptoms began as she was having a bowel movement. Pt also had a stroke on Monday, was just discharged.

## 2018-01-25 NOTE — ED NOTES
Discharged home into care of family with plan to follow up with PCP/neurology as indicated. Alert and interactive. Hemodynamically stable.  NIH 0 WC assist to exit

## 2018-01-25 NOTE — SPIRITUAL CARE NOTE
Pt in ct standby for update. Pt in room with dt and caregiver. Pt is in good spirits and dt and caregiver are optimistic. Pt had been in ER earlier this week for similar symptoms. No further spiritual care intervention needed at this time.

## 2018-01-25 NOTE — DISCHARGE SUMMARY
[facility] Internal Medicine Discharge Summary   Patient ID:  Caity Silva  [id number]  37 year old  9/16/1923    Admit date: 1/22/2018    Discharge date and time: 1/24/2018  3:08 PM     Attending Physician: No att. providers found     Primary Care Physician: Kaitlin Melo on xarelto currently, ?afib, HL presented w/ acute CVA sxs, found to have mild intracranial hemorrhage with foci of prior hemorrhage. Manjit Puneet was held.   Course also complicated by hypertensive urgency which was treated with nicardipine drip and transitio days.  Per Neuro likely would not rec resuming in future     Hypothyroidism  - cont home meds     BRBPR  - noted small amount when wiping  - no abd pain, likely hemorrhoid  - monitor sxs, hgb stable at 14.1, has not occurred here  - follow up with PCP if r appearing inflammation within the right anterior ethmoid sinus. ORBITS: Limited views are unremarkable. OTHER: Negative.    Dictated by (CST): Kaylen Beebe MD on 1/23/2018 at 14:31     Approved by (CST): Kaylen Beebe MD on 1/23/2018 at 14:38          CONCL <2.5           50-69                  150-225                2.5-4.0           >70                      >225                     >4.0  Dictated by (CST): Vargas Sharp MD on 1/23/2018 at 15:38     Approved by (CST): Vargas Sharp MD on 1/2 BRAIN OR HEAD (CPT=70450), 1/22/2017, 7:27. INDICATIONS: Slurred speech resolved 30 mins PTA; dizziness. H/o stroke and TIA. TECHNIQUE: CT images were obtained without contrast material.  Automated exposure control for dose reduction was used.    FINDINGS vertebral arteries. Dictated by (CST): Mar Jordan MD on 1/22/2018 at 16:17     Approved by (CST): Mar Jordan MD on 1/22/2018 at 16:25          CONCLUSION:  1.  Small 4 mm hyperdensity in the left frontal lobe (inferior frontal gyrus), new since 1

## 2018-01-26 NOTE — ED PROVIDER NOTES
Patient Seen in: [facility] AND [facility] Emergency Department    History   Patient presents with:  Stroke (neurologic)    Stated Complaint: Stroke alert    HPI    Patient complains of brief episode of slurred speech and facial droop.   Patient recently had been colon       Smoking status: Never Smoker                                                              Smokeless tobacco: Never Used                      Alcohol use:  No                Review of Systems    Positive for stated complaint: Stroke alert  Other -----------         ------                     CBC W/ DIFFERENTIAL[[id number]]                              Final result                 Please view results for these tests on the individual orders.    RAINBOW DRAW BLUE   RAINBOW MD  [address]          Opal James MD  [address] [facility]  [address] [facility]  [address] [place] [phone]          We recommend that you schedule follow up care with a primary care provider within the ne

## 2018-01-27 ENCOUNTER — APPOINTMENT (OUTPATIENT)
Dept: CT IMAGING | Facility: HOSPITAL | Age: 83
End: 2018-01-27
Attending: EMERGENCY MEDICINE
Payer: MEDICARE

## 2018-01-27 ENCOUNTER — HOSPITAL ENCOUNTER (EMERGENCY)
Facility: HOSPITAL | Age: 83
Discharge: HOME | End: 2018-01-27
Attending: EMERGENCY MEDICINE
Payer: MEDICARE

## 2018-01-27 VITALS
HEART RATE: 80 BPM | OXYGEN SATURATION: 92 % | RESPIRATION RATE: 16 BRPM | DIASTOLIC BLOOD PRESSURE: 88 MMHG | TEMPERATURE: 98 F | SYSTOLIC BLOOD PRESSURE: 143 MMHG
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
## 2018-01-27 DIAGNOSIS — S01.01XA SCALP LACERATION, INITIAL ENCOUNTER: ICD-10-CM

## 2018-01-27 PROCEDURE — 93010 ELECTROCARDIOGRAM REPORT: CPT | Performed by: EMERGENCY MEDICINE

## 2018-01-27 PROCEDURE — 0HQ0XZZ REPAIR SCALP SKIN, EXTERNAL APPROACH: ICD-10-PCS | Performed by: EMERGENCY MEDICINE

## 2018-01-27 PROCEDURE — 70450 CT HEAD/BRAIN W/O DYE: CPT | Performed by: EMERGENCY MEDICINE

## 2018-01-27 PROCEDURE — 72125 CT NECK SPINE W/O DYE: CPT | Performed by: EMERGENCY MEDICINE

## 2018-01-27 PROCEDURE — 93005 ELECTROCARDIOGRAM TRACING: CPT

## 2018-01-27 PROCEDURE — 99284 EMERGENCY DEPT VISIT MOD MDM: CPT

## 2018-01-27 PROCEDURE — 12001 RPR S/N/AX/GEN/TRNK 2.5CM/<: CPT

## 2018-01-27 NOTE — ED PROVIDER NOTES
Patient Seen in: [facility] AND [facility] Emergency Department    History   Patient presents with:  Fall (musculoskeletal, neurologic)  Laceration Abrasion (integumentary)      HPI    Patient presents to the ED complaining of falling at home.   Daughter states t laceration  All other organ systems are reviewed and are negative. Constitutional and vital signs reviewed. Social History and Family History elements reviewed from today, pertinent positives to the presenting problem noted.     Physical Exam     ED atherosclerotic vascular calcifications. 6. Preliminary report was given by [facility]. Ct Spine Cervical (cpt=72125)    Result Date: 1/27/2018  CONCLUSION:  1. Multilevel degenerative spondylosis.  2. No acute fracture or significant malalig anesthetized in the usual fashion. The wound was scrubbed, draped and explored. There were no deep structures involved. The wound was repaired with staples ×2. The wound repair was simple. The procedure was performed by myself.     Additional verbal in

## 2018-01-27 NOTE — ED NOTES
Pt had a mechanical fall hitting head. Unk loc or blood thinners at this time. Pt reports falling at 0200 hrs.

## 2018-02-26 PROBLEM — I63.9 STROKE (HCC): Status: ACTIVE | Noted: 2018-02-26

## 2018-03-14 ENCOUNTER — APPOINTMENT (OUTPATIENT)
Dept: CT IMAGING | Facility: HOSPITAL | Age: 83
DRG: 438 | End: 2018-03-14
Attending: EMERGENCY MEDICINE
Payer: MEDICARE

## 2018-03-14 ENCOUNTER — TELEPHONE (OUTPATIENT)
Dept: MEDSURG UNIT | Facility: HOSPITAL | Age: 83
End: 2018-03-14

## 2018-03-14 ENCOUNTER — APPOINTMENT (OUTPATIENT)
Dept: ULTRASOUND IMAGING | Facility: HOSPITAL | Age: 83
DRG: 438 | End: 2018-03-14
Attending: EMERGENCY MEDICINE
Payer: MEDICARE

## 2018-03-14 ENCOUNTER — HOSPITAL ENCOUNTER (INPATIENT)
Facility: HOSPITAL | Age: 83
LOS: 13 days | Discharge: HOME HEALTH | DRG: 438 | End: 2018-03-27
Attending: EMERGENCY MEDICINE | Admitting: HOSPITALIST
Payer: MEDICARE

## 2018-03-14 DIAGNOSIS — D72.829 LEUKOCYTOSIS, UNSPECIFIED TYPE: ICD-10-CM

## 2018-03-14 DIAGNOSIS — K86.3 PSEUDOCYST OF PANCREAS: ICD-10-CM

## 2018-03-14 DIAGNOSIS — K85.90 PANCREATITIS: ICD-10-CM

## 2018-03-14 DIAGNOSIS — S22.000A CLOSED COMPRESSION FRACTURE OF THORACIC VERTEBRA, INITIAL ENCOUNTER (HCC): ICD-10-CM

## 2018-03-14 DIAGNOSIS — K85.80 OTHER ACUTE PANCREATITIS, UNSPECIFIED COMPLICATION STATUS: Primary | ICD-10-CM

## 2018-03-14 LAB
ALBUMIN SERPL BCP-MCNC: 3.8 G/DL (ref 3.5–4.8)
ALP SERPL-CCNC: 84 U/L (ref 32–100)
ALT SERPL-CCNC: 52 U/L (ref 14–54)
ANION GAP SERPL CALC-SCNC: 10 MMOL/L (ref 0–18)
AST SERPL-CCNC: 105 U/L (ref 15–41)
BASOPHILS # BLD: 0.2 K/UL (ref 0–0.2)
BASOPHILS NFR BLD: 1 %
BILIRUB DIRECT SERPL-MCNC: 0.4 MG/DL (ref 0–0.2)
BILIRUB SERPL-MCNC: 1 MG/DL (ref 0.3–1.2)
BILIRUB UR QL: NEGATIVE
BUN SERPL-MCNC: 24 MG/DL (ref 8–20)
BUN/CREAT SERPL: 25.5 (ref 10–20)
CALCIUM SERPL-MCNC: 9.1 MG/DL (ref 8.5–10.5)
CHLORIDE SERPL-SCNC: 104 MMOL/L (ref 95–110)
CHOLEST SERPL-MCNC: 204 MG/DL (ref 110–200)
CO2 SERPL-SCNC: 25 MMOL/L (ref 22–32)
COLOR UR: YELLOW
CREAT SERPL-MCNC: 0.94 MG/DL (ref 0.5–1.5)
EOSINOPHIL # BLD: 0.3 K/UL (ref 0–0.7)
EOSINOPHIL NFR BLD: 1 %
ERYTHROCYTE [DISTWIDTH] IN BLOOD BY AUTOMATED COUNT: 13 % (ref 11–15)
GLUCOSE BLDC GLUCOMTR-MCNC: 208 MG/DL (ref 70–99)
GLUCOSE SERPL-MCNC: 241 MG/DL (ref 70–99)
GLUCOSE UR-MCNC: >=500 MG/DL
HBA1C MFR BLD: 5.9 % (ref 4–6)
HCT VFR BLD AUTO: 44.3 % (ref 35–48)
HDLC SERPL-MCNC: 52 MG/DL
HGB BLD-MCNC: 15 G/DL (ref 12–16)
KETONES UR-MCNC: NEGATIVE MG/DL
LDLC SERPL CALC-MCNC: 114 MG/DL (ref 0–99)
LEUKOCYTE ESTERASE UR QL STRIP.AUTO: NEGATIVE
LIPASE SERPL-CCNC: 5698 U/L (ref 22–51)
LYMPHOCYTES # BLD: 2.7 K/UL (ref 1–4)
LYMPHOCYTES NFR BLD: 14 %
MAGNESIUM SERPL-MCNC: 1.9 MG/DL (ref 1.8–2.5)
MCH RBC QN AUTO: 32.1 PG (ref 27–32)
MCHC RBC AUTO-ENTMCNC: 33.8 G/DL (ref 32–37)
MCV RBC AUTO: 95 FL (ref 80–100)
MONOCYTES # BLD: 1.1 K/UL (ref 0–1)
MONOCYTES NFR BLD: 6 %
NEUTROPHILS # BLD AUTO: 15.3 K/UL (ref 1.8–7.7)
NEUTROPHILS NFR BLD: 78 %
NITRITE UR QL STRIP.AUTO: NEGATIVE
NONHDLC SERPL-MCNC: 152 MG/DL
OSMOLALITY UR CALC.SUM OF ELEC: 300 MOSM/KG (ref 275–295)
PH UR: 6 [PH] (ref 5–8)
PLATELET # BLD AUTO: 242 K/UL (ref 140–400)
PMV BLD AUTO: 10.2 FL (ref 7.4–10.3)
POTASSIUM SERPL-SCNC: 3.1 MMOL/L (ref 3.3–5.1)
PROT SERPL-MCNC: 7.2 G/DL (ref 5.9–8.4)
PROT UR-MCNC: NEGATIVE MG/DL
RBC # BLD AUTO: 4.67 M/UL (ref 3.7–5.4)
RBC #/AREA URNS AUTO: 11 /HPF
SODIUM SERPL-SCNC: 139 MMOL/L (ref 136–144)
SP GR UR STRIP: 1.02 (ref 1–1.03)
TRIGL SERPL-MCNC: 189 MG/DL (ref 1–149)
UROBILINOGEN UR STRIP-ACNC: <2
VIT C UR-MCNC: NEGATIVE MG/DL
WBC # BLD AUTO: 19.6 K/UL (ref 4–11)
WBC #/AREA URNS AUTO: 5 /HPF

## 2018-03-14 PROCEDURE — 81001 URINALYSIS AUTO W/SCOPE: CPT | Performed by: EMERGENCY MEDICINE

## 2018-03-14 PROCEDURE — 83690 ASSAY OF LIPASE: CPT | Performed by: EMERGENCY MEDICINE

## 2018-03-14 PROCEDURE — 93010 ELECTROCARDIOGRAM REPORT: CPT | Performed by: EMERGENCY MEDICINE

## 2018-03-14 PROCEDURE — 82962 GLUCOSE BLOOD TEST: CPT

## 2018-03-14 PROCEDURE — 76705 ECHO EXAM OF ABDOMEN: CPT | Performed by: EMERGENCY MEDICINE

## 2018-03-14 PROCEDURE — 74177 CT ABD & PELVIS W/CONTRAST: CPT | Performed by: EMERGENCY MEDICINE

## 2018-03-14 PROCEDURE — 85025 COMPLETE CBC W/AUTO DIFF WBC: CPT | Performed by: EMERGENCY MEDICINE

## 2018-03-14 PROCEDURE — 97162 PT EVAL MOD COMPLEX 30 MIN: CPT

## 2018-03-14 PROCEDURE — 99285 EMERGENCY DEPT VISIT HI MDM: CPT

## 2018-03-14 PROCEDURE — 96376 TX/PRO/DX INJ SAME DRUG ADON: CPT

## 2018-03-14 PROCEDURE — 96365 THER/PROPH/DIAG IV INF INIT: CPT

## 2018-03-14 PROCEDURE — 80048 BASIC METABOLIC PNL TOTAL CA: CPT | Performed by: EMERGENCY MEDICINE

## 2018-03-14 PROCEDURE — 83036 HEMOGLOBIN GLYCOSYLATED A1C: CPT | Performed by: NURSE PRACTITIONER

## 2018-03-14 PROCEDURE — 96375 TX/PRO/DX INJ NEW DRUG ADDON: CPT

## 2018-03-14 PROCEDURE — 93005 ELECTROCARDIOGRAM TRACING: CPT

## 2018-03-14 PROCEDURE — 83735 ASSAY OF MAGNESIUM: CPT | Performed by: NURSE PRACTITIONER

## 2018-03-14 PROCEDURE — 80076 HEPATIC FUNCTION PANEL: CPT | Performed by: EMERGENCY MEDICINE

## 2018-03-14 PROCEDURE — 80061 LIPID PANEL: CPT | Performed by: NURSE PRACTITIONER

## 2018-03-14 PROCEDURE — 97166 OT EVAL MOD COMPLEX 45 MIN: CPT

## 2018-03-14 RX ORDER — MORPHINE SULFATE 2 MG/ML
2 INJECTION, SOLUTION INTRAMUSCULAR; INTRAVENOUS ONCE
Status: COMPLETED | OUTPATIENT
Start: 2018-03-14 | End: 2018-03-14

## 2018-03-14 RX ORDER — MORPHINE SULFATE 2 MG/ML
INJECTION, SOLUTION INTRAMUSCULAR; INTRAVENOUS
Status: COMPLETED
Start: 2018-03-14 | End: 2018-03-14

## 2018-03-14 RX ORDER — MORPHINE SULFATE 4 MG/ML
INJECTION, SOLUTION INTRAMUSCULAR; INTRAVENOUS
Status: COMPLETED
Start: 2018-03-14 | End: 2018-03-14

## 2018-03-14 RX ORDER — SODIUM CHLORIDE 0.9 % (FLUSH) 0.9 %
3 SYRINGE (ML) INJECTION AS NEEDED
Status: DISCONTINUED | OUTPATIENT
Start: 2018-03-14 | End: 2018-03-27

## 2018-03-14 RX ORDER — LEVOTHYROXINE SODIUM 0.05 MG/1
50 TABLET ORAL
Status: DISCONTINUED | OUTPATIENT
Start: 2018-03-14 | End: 2018-03-27

## 2018-03-14 RX ORDER — SODIUM CHLORIDE 9 MG/ML
INJECTION, SOLUTION INTRAVENOUS CONTINUOUS
Status: DISCONTINUED | OUTPATIENT
Start: 2018-03-14 | End: 2018-03-19

## 2018-03-14 RX ORDER — MORPHINE SULFATE 2 MG/ML
1 INJECTION, SOLUTION INTRAMUSCULAR; INTRAVENOUS EVERY 2 HOUR PRN
Status: DISCONTINUED | OUTPATIENT
Start: 2018-03-14 | End: 2018-03-27

## 2018-03-14 RX ORDER — AMLODIPINE BESYLATE 5 MG/1
5 TABLET ORAL DAILY
Status: DISCONTINUED | OUTPATIENT
Start: 2018-03-14 | End: 2018-03-27

## 2018-03-14 RX ORDER — LEVETIRACETAM 500 MG/1
500 TABLET ORAL 2 TIMES DAILY
Status: DISCONTINUED | OUTPATIENT
Start: 2018-03-14 | End: 2018-03-27

## 2018-03-14 NOTE — CM/SW NOTE
SW was notified that pt is current w/ [facility]    PT recommendations are for home. SW to remain available if needs arise.     Jordi Lindo, [address]

## 2018-03-14 NOTE — CONSULTS
[facility] HOSP - [facility]    Report of Consultation    Marcel Harris Patient Status:  Inpatient    1923 MRN [id number]   Location [facility] 5SW/SE Attending Rodney Link MD   Hosp Day # 0 PCP Maria C Rosas MD     Reason for Consultation: Oral, Daily  •  levETIRAcetam (KEPPRA) tab 500 mg, 500 mg, Oral, BID  •  Levothyroxine Sodium (SYNTHROID) tab 50 mcg, 50 mcg, Oral, Before breakfast  •  metoprolol Tartrate (LOPRESSOR) tab 25 mg, 25 mg, Oral, 2x Daily(Beta Blocker)    Review of Systems: 10 3.8 03/14/2018   ALKPHO 84 03/14/2018   BILT 1.0 03/14/2018   TP 7.2 03/14/2018    03/14/2018   ALT 52 03/14/2018   LIP 5,698 03/14/2018   MG 1.9 03/14/2018   PGLU 208 03/14/2018       Imaging:  Us Gallbladder (cpt=76705)    Result Date: 3/14/2018

## 2018-03-14 NOTE — PHYSICAL THERAPY NOTE
PHYSICAL THERAPY EVALUATION - INPATIENT     Room Number: 537/537-A  Evaluation Date: 3/14/2018  Type of Evaluation: Initial   Physician Order: PT Eval and Treat    Presenting Problem: pancreatis  Reason for Therapy: Mobility Dysfunction and Discharge Plan Intracranial hemorrhage (HCC)    Other acute pancreatitis, unspecified complication status    Closed compression fracture of thoracic vertebra, initial encounter (HCC)    Leukocytosis, unspecified type      Past Medical History  Past Medical History:   [place] FORM - BASIC MOBILITY  How much difficulty does the patient currently have. ..  -   Turning over in bed (including adjusting bedclothes, sheets and blankets)?: A Little   -   Sitting down on and standing up from a chair with arms (e.g., wheelchair, bedside discharge.    Goal #5   Current Status    Goal #6    Goal #6  Current Status

## 2018-03-14 NOTE — H&P
[facility] Hospitalist Team  History and Physical     ASSESSMENT / PLAN:   Ms. Janell Castro is a 79 y/o F w/ HTN, [place], ?afib, HL, previous TIA with recent admission to [facility] on 1/24/18 with acute CVA with mild intracranial hemorrhage with foci of prior hemorrhage, AC Kb      Abnormal EEG  -EEG mild but congruent with area of abnormality on imaging  -keppra 250 BID  -pt no longer drives     HTN   - cont norvasc and metoprolol   - follow      Hypothyroidism  - cont home meds    GOC  -DNR, paper in chart  -POA daug Temp 98.2 °F (36.8 °C)   Resp 20   Wt 121 lb 0.5 oz (54.9 kg)   SpO2 92%   BMI 28.13 kg/m²     GENERAL: no apparent distress  NEUROLOGIC: A/A; Ox3: strength normal; sensations intact  SKIN: no rashes  HEENT: normocephalic; normal nose, pharynx and TM's; PE kidney problems   • Diabetes Mother    • Heart Disorder Mother    • Cancer Mother      colon       Review of Systems  A comprehensive 10 point review of systems was completed. Pertinent positives and negatives noted in the the HPI.       DIAGNOSTIC DATA: with increased , mild increase bili at 0.4 otherwise normal  -US abd- normal  -CT A/P-per ER notes-official read pending-CT team looking into this- mild pericholecystic fluid with stranding  but no stones, GB thickening or bile duct dilation.  Some p

## 2018-03-14 NOTE — OCCUPATIONAL THERAPY NOTE
OCCUPATIONAL THERAPY EVALUATION - INPATIENT     Room Number: 537/537-A  Evaluation Date: 3/14/2018  Type of Evaluation: Initial  Presenting Problem: acute pancreatitis    Physician Order: IP Consult to Occupational Therapy  Reason for Therapy: ADL/IADL Dys Intracranial hemorrhage (HCC)    Other acute pancreatitis, unspecified complication status    Closed compression fracture of thoracic vertebra, initial encounter (HCC)    Leukocytosis, unspecified type      Past Medical History  Past Medical History:   Belen 4/5    COORDINATION  Gross Motor: WFL   Fine Motor: WFL     ADDITIONAL TESTS                                    NEUROLOGICAL FINDINGS  Neurological Findings: None                ACTIVITIES OF DAILY LIVING ASSESSMENT  AM-PAC ‘6-Clicks’ Inpatient Daily Activ 3x/week

## 2018-03-14 NOTE — PAYOR COMM NOTE
Admit Orders     Start     Ordered    03/14/18 0720  Admit to inpatient Once  ([address])  Once     Ordering Provider:  Radha Tse MD   Question Answer Comment   Diagnosis Other acute pancreatitis, unspecified complication status essential hypertension        Past Surgical History:  No date: OTHER SURGICAL HISTORY      Comment: wrist, details unclear  5/15/14: OTHER SURGICAL HISTORY      Comment: cysto, flow us Dr Darylene Ear        Smoking status: Never Smoker PANEL (7) - Abnormal; Notable for the following:      (*)     Bilirubin, Direct 0.4 (*)     All other components within normal limits   URINALYSIS WITH CULTURE REFLEX - Abnormal; Notable for the following:     Clarity Urine Hazy (*)     Glucose Urin CONTRAST    IMPRESSION  1. Mild pericholecystic fluid and stranding, but no evidence of radiopaque gallstones, gross gallbladder wall thickening, or biliary ductal dilatation.  Advise correlation with clinical exam, laboratory studies, and further evaluatio approximately 0.9 cm in diameter. Mild cardiomegaly. Prominent coronary artery calcifications. Mild dependent and basilar atelectasis. Transitional lumbosacral vertebral body with partially lumbarized S1 sacral segment.  Mild dextroscoliosis of the lumb months to obtain basic health screening including reassessment of your blood pressure.     Medications Prescribed:[AM.1]  Current Discharge Medication List        Present on Admission  Date Reviewed: 2/26/2018          ICD-10-CM Noted POA    Acute pancreati 189[GB.4], no new meds to cause pancreatitis  -prn pain meds  -zosyn[GB.2]  -IVF[GB.1]- 1 L bolus given in ER additional 500 cc now with age and diastolic dysfunction then 150 cc/hr[GB.2], NPO  -surger[GB.1]y/GI[GB. 2]  consult    Hypokalemia  -replete via Hospitalist Team  Pager [phone]  Answering Service number: [phone]  3/14/2018      HISTORY:   CC: Patient presents with:  Abdominal Pain  Shoulder Pain       PCP: Violet Jara MD    History of Present Illness:     Ms. Jareth Veras is a 81 y/o F w/ Medical History:   Diagnosis Date   • Chronic rhinitis    • DEPRESSION    • Depression    • Fall in bathtub    • HYPERTENSION    • HYPOTHYROIDISM    • Incontinence of urine    • STROKE     transient ischemic attacks   • Stroke [facility])    • TIA (transient isc hours. Radiology: Us Gallbladder (cpt=76705)    Result Date: 3/14/2018  CONCLUSION:  1. Normal examination. 2. A preliminary report was submitted and there is agreement without major discrepancies.              SEE ATTENDING NOTE BELOW[GB.1]          Aislinn Route User    3/14/2018 0558 New Bag 3.375 g Intravenous Felicia Mcknight RN      0.9%  NaCl infusion     Date Action Dose Route User    3/14/2018 1057 New Bag (none) Intravenous [facility]      sodium chloride 0.9% IV bolus 500 mL     Date Action D

## 2018-03-14 NOTE — ED PROVIDER NOTES
Patient Seen in: [facility] AND [facility] Emergency Department    History   Patient presents with:  Abdominal Pain  Shoulder Pain    Stated Complaint: abd pain, shoulder pain    HPI    Pt is 79 yo F from home who p/w RUQ and right shoulder pain x 1 hour.  Vivienne murphys sign negative. No distension or pulsatile masses. BS normal  Extremities: FROM of all extremities, no cyanosis/clubbing/edema.  Radial pulses 2+  Neuro: CN intact, normal speech, no focal deficits  SKIN: warm, dry, no rashes        ED Course     Lab EKG    Rate, intervals and axes as noted on EKG Report.   Rate: 94  Rhythm: Sinus Rhythm  Reading: no MAI           ED Course as of Mar 14 0539  ------------------------------------------------------------       [facility]             Ultrasound DARIELA KAN stranding. Small right renal cortical low density, not fully evaluated, possibly a renal cyst. No gross bladder wall thickening. Anteverted uterus. No gross adnexal mass. Trace free fluid in the left cul-de-sac, nonspecific.     No bowel obstruction, gross elevation in WBC and exam.   D/w dr Palmer Au, will start IV zosyn. Pt to remain NPO.          Admission disposition: 3/14/2018  4:21 AM           Disposition and Plan     Clinical Impression:  Other acute pancreatitis, unspecified complication status  (primary

## 2018-03-15 ENCOUNTER — APPOINTMENT (OUTPATIENT)
Dept: NUCLEAR MEDICINE | Facility: HOSPITAL | Age: 83
DRG: 438 | End: 2018-03-15
Attending: INTERNAL MEDICINE
Payer: MEDICARE

## 2018-03-15 LAB
ALBUMIN SERPL BCP-MCNC: 3 G/DL (ref 3.5–4.8)
ALBUMIN/GLOB SERPL: 1.1 {RATIO} (ref 1–2)
ALP SERPL-CCNC: 54 U/L (ref 32–100)
ALT SERPL-CCNC: 36 U/L (ref 14–54)
ANION GAP SERPL CALC-SCNC: 8 MMOL/L (ref 0–18)
AST SERPL-CCNC: 45 U/L (ref 15–41)
BASOPHILS # BLD: 0 K/UL (ref 0–0.2)
BASOPHILS NFR BLD: 0 %
BILIRUB SERPL-MCNC: 1.1 MG/DL (ref 0.3–1.2)
BUN SERPL-MCNC: 25 MG/DL (ref 8–20)
BUN/CREAT SERPL: 20 (ref 10–20)
CALCIUM SERPL-MCNC: 7.5 MG/DL (ref 8.5–10.5)
CHLORIDE SERPL-SCNC: 109 MMOL/L (ref 95–110)
CO2 SERPL-SCNC: 22 MMOL/L (ref 22–32)
CREAT SERPL-MCNC: 1.25 MG/DL (ref 0.5–1.5)
EOSINOPHIL # BLD: 0 K/UL (ref 0–0.7)
EOSINOPHIL NFR BLD: 0 %
ERYTHROCYTE [DISTWIDTH] IN BLOOD BY AUTOMATED COUNT: 12.9 % (ref 11–15)
GLOBULIN PLAS-MCNC: 2.7 G/DL (ref 2.5–3.7)
GLUCOSE SERPL-MCNC: 151 MG/DL (ref 70–99)
HCT VFR BLD AUTO: 44.2 % (ref 35–48)
HGB BLD-MCNC: 14.8 G/DL (ref 12–16)
LIPASE SERPL-CCNC: 135 U/L (ref 22–51)
LYMPHOCYTES # BLD: 0.7 K/UL (ref 1–4)
LYMPHOCYTES NFR BLD: 3 %
MCH RBC QN AUTO: 31.6 PG (ref 27–32)
MCHC RBC AUTO-ENTMCNC: 33.5 G/DL (ref 32–37)
MCV RBC AUTO: 94.3 FL (ref 80–100)
MONOCYTES # BLD: 0.7 K/UL (ref 0–1)
MONOCYTES NFR BLD: 3 %
NEUTROPHILS # BLD AUTO: 21.6 K/UL (ref 1.8–7.7)
NEUTROPHILS NFR BLD: 94 %
OSMOLALITY UR CALC.SUM OF ELEC: 295 MOSM/KG (ref 275–295)
PLATELET # BLD AUTO: 179 K/UL (ref 140–400)
PMV BLD AUTO: 9.8 FL (ref 7.4–10.3)
POTASSIUM SERPL-SCNC: 3.5 MMOL/L (ref 3.3–5.1)
PROT SERPL-MCNC: 5.7 G/DL (ref 5.9–8.4)
RBC # BLD AUTO: 4.69 M/UL (ref 3.7–5.4)
SODIUM SERPL-SCNC: 139 MMOL/L (ref 136–144)
WBC # BLD AUTO: 23.1 K/UL (ref 4–11)

## 2018-03-15 PROCEDURE — 83690 ASSAY OF LIPASE: CPT | Performed by: NURSE PRACTITIONER

## 2018-03-15 PROCEDURE — A4216 STERILE WATER/SALINE, 10 ML: HCPCS | Performed by: NURSE PRACTITIONER

## 2018-03-15 PROCEDURE — 80053 COMPREHEN METABOLIC PANEL: CPT | Performed by: NURSE PRACTITIONER

## 2018-03-15 PROCEDURE — 85025 COMPLETE CBC W/AUTO DIFF WBC: CPT | Performed by: NURSE PRACTITIONER

## 2018-03-15 PROCEDURE — 78226 HEPATOBILIARY SYSTEM IMAGING: CPT | Performed by: INTERNAL MEDICINE

## 2018-03-15 RX ORDER — ONDANSETRON 2 MG/ML
4 INJECTION INTRAMUSCULAR; INTRAVENOUS EVERY 6 HOURS PRN
Status: DISCONTINUED | OUTPATIENT
Start: 2018-03-15 | End: 2018-03-27

## 2018-03-15 NOTE — PLAN OF CARE
Problem: RESPIRATORY - ADULT  Goal: Achieves optimal ventilation and oxygenation  INTERVENTIONS:  - Assess for changes in respiratory status  - Assess for changes in mentation and behavior  - Position to facilitate oxygenation and minimize respiratory effo needed  - Optimize oral hygiene and moisture  - Encourage food from home; allow for food preferences  - Enhance eating environment   Outcome: Progressing      Problem: METABOLIC/FLUID AND ELECTROLYTES - ADULT  Goal: Electrolytes maintained within normal li and sodium.  Initiate appropriate interventions as ordered   Outcome: Progressing      Problem: Impaired Functional Mobility  Goal: Achieve highest/safest level of mobility/gait  Interventions:  - Assess patient's functional ability and stability  - Promote

## 2018-03-15 NOTE — PROGRESS NOTES
[facility] Hospitalist Team  Progress Note    Diana Smith Patient Status:  Inpatient    1923 MRN [id number]   Location [facility] 5SW/SE Attending Rodrigue Edwards MD   Hosp Day # 1 PCP Wanetta Lefort, MD     CC: Follow Up  PCP: Wanetta Lefort, MD neurology this week or next week.  No statin for LDL of 105 given possible amyloid angiopathy and increased risk of bleeding  -sees Neurology at [facility]     Abnormal EEG  -EEG mild but congruent with area of abnormality on imaging  -keppra 250 BID  -pt no longe PGLU  208*       No results for input(s): TROP in the last 72 hours.         MEDICATIONS        Current Facility-Administered Medications:  potassium chloride 40 mEq in sodium chloride 0.9 % 250 mL IVPB 40 mEq Intravenous Once   Normal Saline Flush 0.9 % telephone to Dr. Palmer Au on 3/14/18 at 940. SEE ATTENDING NOTE BELOW:   Patient seen and examined independently. Discussed with APN and agree with note above.     S: Patient feeling better this morning, still with abd pain, no nausea or vomiting, s chronic in basal ganglia and scattered hemorrhagic foci  -Carotid US without sig finding  -echo with normal EF, grade 1 DD, mild AI  -?  Hx of afib, but none noted on tele last admission  -Pt to be off [facility] indefinitely as per cards note, pt to see neurology

## 2018-03-15 NOTE — PROGRESS NOTES
[facility] HOSP - [facility]    Progress Note    Lilliana Kraft Patient Status:  Inpatient    1923 MRN [id number]   Location [facility] 5SW/SE Attending Devora Arriola MD   [facility] Day # 1 PCP Cindy Gerber MD     Subjective:     Pt sitting up in 15.0  14.8   HCT  44.3  44.2   MCV  95.0  94.3   MCH  32.1*  31.6   MCHC  33.8  33.5   RDW  13.0  12.9   WBC  19.6*  23.1*   PLT  242  179       Lab Results  Component Value Date   INR <0.9 07/13/2009       Recent Labs   Lab  03/14/18   0124  03/15/18   0 is felt to be less likely but is also in the differential.  Severe widespread atherosclerosis. Dilated left pelvic vasculature and left ovarian vein is nonspecific.    Preliminary report was submitted by the [facility] teleradiologist and there are no signific

## 2018-03-15 NOTE — PROGRESS NOTES
[facility]    GI Progress Note      Brandon Santos Patient Status:  Inpatient    1923 MRN [id number]   Location [facility] 5SW/SE Attending Uzma Ca MD   Hosp Day # 1 PCP Jordon Phillips MD          SUBJECTIVE:     No even concurrently performed ultrasound gallbladder. Age indeterminate compression fracture of T9. Correlation with point tenderness suggested. Bilateral lower lobe air space opacities suggestive of atelectasis.  Underlying pneumonia is felt to be less likely b

## 2018-03-15 NOTE — PLAN OF CARE
Problem: RESPIRATORY - ADULT  Goal: Achieves optimal ventilation and oxygenation  INTERVENTIONS:  - Assess for changes in respiratory status  - Assess for changes in mentation and behavior  - Position to facilitate oxygenation and minimize respiratory effo with meals as needed  - Monitor I&O, WT and lab values  - Obtain nutritional consult as needed  - Optimize oral hygiene and moisture  - Encourage food from home; allow for food preferences  - Enhance eating environment  Outcome: Not Progressing  NPO with s status  - Initiate measures to prevent increased intracranial pressure  - Maintain blood pressure and fluid volume within ordered parameters to optimize cerebral perfusion and minimize risk of hemorrhage  - Monitor temperature, glucose, and sodium.  Initiat

## 2018-03-16 LAB
ALBUMIN SERPL BCP-MCNC: 2.5 G/DL (ref 3.5–4.8)
ALP SERPL-CCNC: 55 U/L (ref 32–100)
ALT SERPL-CCNC: 28 U/L (ref 14–54)
ANION GAP SERPL CALC-SCNC: 6 MMOL/L (ref 0–18)
AST SERPL-CCNC: 46 U/L (ref 15–41)
BASOPHILS # BLD: 0 K/UL (ref 0–0.2)
BASOPHILS NFR BLD: 0 %
BILIRUB DIRECT SERPL-MCNC: 0.4 MG/DL (ref 0–0.2)
BILIRUB SERPL-MCNC: 1.2 MG/DL (ref 0.3–1.2)
BUN SERPL-MCNC: 15 MG/DL (ref 8–20)
BUN/CREAT SERPL: 16.7 (ref 10–20)
CALCIUM SERPL-MCNC: 7.2 MG/DL (ref 8.5–10.5)
CHLORIDE SERPL-SCNC: 110 MMOL/L (ref 95–110)
CO2 SERPL-SCNC: 22 MMOL/L (ref 22–32)
CREAT SERPL-MCNC: 0.9 MG/DL (ref 0.5–1.5)
EOSINOPHIL # BLD: 0 K/UL (ref 0–0.7)
EOSINOPHIL NFR BLD: 0 %
ERYTHROCYTE [DISTWIDTH] IN BLOOD BY AUTOMATED COUNT: 13.2 % (ref 11–15)
GLUCOSE SERPL-MCNC: 121 MG/DL (ref 70–99)
HCT VFR BLD AUTO: 40.1 % (ref 35–48)
HGB BLD-MCNC: 13.5 G/DL (ref 12–16)
LIPASE SERPL-CCNC: 22 U/L (ref 22–51)
LYMPHOCYTES # BLD: 0.7 K/UL (ref 1–4)
LYMPHOCYTES NFR BLD: 3 %
MCH RBC QN AUTO: 32 PG (ref 27–32)
MCHC RBC AUTO-ENTMCNC: 33.7 G/DL (ref 32–37)
MCV RBC AUTO: 94.8 FL (ref 80–100)
MONOCYTES # BLD: 0.5 K/UL (ref 0–1)
MONOCYTES NFR BLD: 3 %
NEUTROPHILS # BLD AUTO: 19.7 K/UL (ref 1.8–7.7)
NEUTROPHILS NFR BLD: 94 %
OSMOLALITY UR CALC.SUM OF ELEC: 288 MOSM/KG (ref 275–295)
PLATELET # BLD AUTO: 180 K/UL (ref 140–400)
PMV BLD AUTO: 10.4 FL (ref 7.4–10.3)
POTASSIUM SERPL-SCNC: 3.3 MMOL/L (ref 3.3–5.1)
POTASSIUM SERPL-SCNC: 3.3 MMOL/L (ref 3.3–5.1)
POTASSIUM SERPL-SCNC: 3.4 MMOL/L (ref 3.3–5.1)
PROT SERPL-MCNC: 5.6 G/DL (ref 5.9–8.4)
RBC # BLD AUTO: 4.22 M/UL (ref 3.7–5.4)
SODIUM SERPL-SCNC: 138 MMOL/L (ref 136–144)
WBC # BLD AUTO: 20.9 K/UL (ref 4–11)

## 2018-03-16 PROCEDURE — 84132 ASSAY OF SERUM POTASSIUM: CPT | Performed by: HOSPITALIST

## 2018-03-16 PROCEDURE — 97116 GAIT TRAINING THERAPY: CPT

## 2018-03-16 PROCEDURE — 85025 COMPLETE CBC W/AUTO DIFF WBC: CPT | Performed by: INTERNAL MEDICINE

## 2018-03-16 PROCEDURE — 97530 THERAPEUTIC ACTIVITIES: CPT

## 2018-03-16 PROCEDURE — 83690 ASSAY OF LIPASE: CPT | Performed by: HOSPITALIST

## 2018-03-16 PROCEDURE — 80076 HEPATIC FUNCTION PANEL: CPT | Performed by: INTERNAL MEDICINE

## 2018-03-16 PROCEDURE — 97535 SELF CARE MNGMENT TRAINING: CPT

## 2018-03-16 PROCEDURE — A4216 STERILE WATER/SALINE, 10 ML: HCPCS | Performed by: NURSE PRACTITIONER

## 2018-03-16 PROCEDURE — 80048 BASIC METABOLIC PNL TOTAL CA: CPT | Performed by: NURSE PRACTITIONER

## 2018-03-16 NOTE — PLAN OF CARE
Problem: RESPIRATORY - ADULT  Goal: Achieves optimal ventilation and oxygenation  INTERVENTIONS:  - Assess for changes in respiratory status  - Assess for changes in mentation and behavior  - Position to facilitate oxygenation and minimize respiratory effo needed  - Optimize oral hygiene and moisture  - Encourage food from home; allow for food preferences  - Enhance eating environment   Outcome: Progressing      Problem: METABOLIC/FLUID AND ELECTROLYTES - ADULT  Goal: Electrolytes maintained within normal li and sodium.  Initiate appropriate interventions as ordered   Outcome: Progressing      Problem: Impaired Functional Mobility  Goal: Achieve highest/safest level of mobility/gait  Interventions:  - Assess patient's functional ability and stability  - Promote alone. Daughter at bedside. Patient IV fluids decreased and re-increased to 150cc. 500cc Bolus given. Zosyn given. HIDA scan completed. Continue to monitor frequently.

## 2018-03-16 NOTE — OCCUPATIONAL THERAPY NOTE
OCCUPATIONAL THERAPY TREATMENT NOTE - INPATIENT        Room Number: 537/537-A           Presenting Problem: acute pancreatitis    Problem List  Principal Problem:    Acute pancreatitis  Active Problems:    Essential hypertension    Other specified hypothyr activity 90-96%  Room air  Heart Rate: with activity 84   SOB was noted after mobility    ACTIVITIES OF DAILY LIVING ASSESSMENT  AM-PAC ‘6-Clicks’ Inpatient Daily Activity Short Form  How much help from another person does the patient currently need…  -

## 2018-03-16 NOTE — PROGRESS NOTES
[facility]    Progress Note    Terry Miss Patient Status:  Inpatient    1923 MRN [id number]   Location [facility] 5SW/SE Attending America Guan MD   Hosp Day # 2 PCP Adán Feldman MD     Subjective:     Pt sleeping  [place] is improved since yesterday. She knows where she is she answers questions per    Impression. Please be pancreatitis. Still has a very high white count. We will continue antibiotics and close observation.   Workup appears to be  Gallbladder without stone region of the gallbladder (based on comparison with CT imaging). However, the persistence  of activity in the expected region of the gallbladder favors a normal exam.  2. No evidence of biliary obstruction.    Results of this examination were discussed with

## 2018-03-16 NOTE — PROGRESS NOTES
[facility] Hospitalist Team  Progress Note    Silvia Hilliard Patient Status:  Inpatient    1923 MRN [id number]   Location [facility] 5SW/SE Attending Lillian Vaughn MD   Hosp Day # 2 PCP Clare Chua MD     CC: Follow Up  PCP: Clare Chua MD foci  -Carotid US without sig finding  -echo with normal EF, grade 1 DD, mild AI  -? Hx of afib, but none noted on tele last admission  -Pt to be off [facility] indefinitely as per cards note, pt to see neurology this week or next week.  No statin for LDL of 105 gi 0124  03/15/18   0621  03/16/18   0532   NA  139  139  138   K  3.1*  3.5  3.3  3.3   CL  104  109  110   CO2  25  22  22   BUN  24*  25*  15   CREATSERUM  0.94  1.25  0.90   CA  9.1  7.5*  7.2*   MG  1.9   --    --    GLU  241*  151*  121*       Recent La examination were discussed with the patient's physician, Dr. Markel Rodriges, by Dr. Brianna Prince at [phone] on 03/15/2018. Ct Abdomen Pelvis Iv And Oral Contrast (er)    Result Date: 3/14/2018  CONCLUSION:   Findings suspicious for acute pancreatitis.  No pancreatic necr 5,698-->135-->22, LFT's with increased -->45-->46  -US GB-normal  -CT A/P- mild pericholecystic fluid with stranding  but no stones, GB thickening or bile duct dilation.  Some possible peripancreatic stranding to proximal to mid body of pancrease  -H

## 2018-03-16 NOTE — PLAN OF CARE
Problem: RESPIRATORY - ADULT  Goal: Achieves optimal ventilation and oxygenation  INTERVENTIONS:  - Assess for changes in respiratory status  - Assess for changes in mentation and behavior  - Position to facilitate oxygenation and minimize respiratory effo meals as needed  - Monitor I&O, WT and lab values  - Obtain nutritional consult as needed  - Optimize oral hygiene and moisture  - Encourage food from home; allow for food preferences  - Enhance eating environment   Outcome: Progressing  Patient tolerating neurological status  INTERVENTIONS  - Assess for and report changes in neurological status  - Initiate measures to prevent increased intracranial pressure  - Maintain blood pressure and fluid volume within ordered parameters to optimize cerebral perfusion patient/family  - Incorporate patient and family knowledge, values, beliefs, and cultural backgrounds into the planning and delivery of care  - Encourage patient/family to participate in care and decision-making at the level they choose  - Honor patient an

## 2018-03-16 NOTE — PROGRESS NOTES
[facility]    GI Progress Note      Aicha Guerra Patient Status:  Inpatient    1923 MRN [id number]   Location [facility] 5SW/SE Attending Vadim Palma MD   Hosp Day # 2 PCP Kaveh Kumar MD          SUBJECTIVE:     Patient HL may be playing a causal role but her TG are not that high. She is on no meds that would predispose her to pancreatitis. Her LFTs are mildly elevated and US shows no ductal dilatation.  Passed sludge??  2. Leukocytosis -?secondary to the above    PLAN:

## 2018-03-16 NOTE — PHYSICAL THERAPY NOTE
PHYSICAL THERAPY TREATMENT NOTE - INPATIENT     Room Number: 537/537-A       Presenting Problem: pancreatis    Problem List  Principal Problem:    Acute pancreatitis  Active Problems:    Essential hypertension    Other specified hypothyroidism    Intracran Good  Dynamic Sitting: Good           Static Standing: Fair  Dynamic Standing: Fair -    ACTIVITY TOLERANCE  O2 Saturation: 90%  Room air  Heart Rate: 62    AM-PAC '6-Clicks' INPATIENT SHORT FORM - BASIC MOBILITY  How much difficulty does the patient maninder Patient will negotiate 5 stairs/one curb w/ assistive device and supervision   Goal #4   Current Status Not tested    Goal #5 Patient to demonstrate independence with home activity/exercise instructions provided to patient in preparation for discharge.    G

## 2018-03-17 LAB
ALBUMIN SERPL BCP-MCNC: 2.5 G/DL (ref 3.5–4.8)
ALP SERPL-CCNC: 68 U/L (ref 32–100)
ALT SERPL-CCNC: 25 U/L (ref 14–54)
ANION GAP SERPL CALC-SCNC: 7 MMOL/L (ref 0–18)
AST SERPL-CCNC: 41 U/L (ref 15–41)
BASOPHILS # BLD: 0.1 K/UL (ref 0–0.2)
BASOPHILS NFR BLD: 0 %
BILIRUB DIRECT SERPL-MCNC: 0.4 MG/DL (ref 0–0.2)
BILIRUB SERPL-MCNC: 1.2 MG/DL (ref 0.3–1.2)
BUN SERPL-MCNC: 6 MG/DL (ref 8–20)
BUN/CREAT SERPL: 6.8 (ref 10–20)
CALCIUM SERPL-MCNC: 7.2 MG/DL (ref 8.5–10.5)
CHLORIDE SERPL-SCNC: 106 MMOL/L (ref 95–110)
CO2 SERPL-SCNC: 25 MMOL/L (ref 22–32)
CREAT SERPL-MCNC: 0.88 MG/DL (ref 0.5–1.5)
EOSINOPHIL # BLD: 0 K/UL (ref 0–0.7)
EOSINOPHIL NFR BLD: 0 %
ERYTHROCYTE [DISTWIDTH] IN BLOOD BY AUTOMATED COUNT: 13 % (ref 11–15)
GLUCOSE SERPL-MCNC: 116 MG/DL (ref 70–99)
HCT VFR BLD AUTO: 37.9 % (ref 35–48)
HGB BLD-MCNC: 12.9 G/DL (ref 12–16)
LYMPHOCYTES # BLD: 0.7 K/UL (ref 1–4)
LYMPHOCYTES NFR BLD: 4 %
MAGNESIUM SERPL-MCNC: 1.4 MG/DL (ref 1.8–2.5)
MCH RBC QN AUTO: 32.1 PG (ref 27–32)
MCHC RBC AUTO-ENTMCNC: 34.1 G/DL (ref 32–37)
MCV RBC AUTO: 94.1 FL (ref 80–100)
MONOCYTES # BLD: 0.7 K/UL (ref 0–1)
MONOCYTES NFR BLD: 4 %
NEUTROPHILS # BLD AUTO: 16.4 K/UL (ref 1.8–7.7)
NEUTROPHILS NFR BLD: 92 %
OSMOLALITY UR CALC.SUM OF ELEC: 285 MOSM/KG (ref 275–295)
PLATELET # BLD AUTO: 164 K/UL (ref 140–400)
PMV BLD AUTO: 9.8 FL (ref 7.4–10.3)
POTASSIUM SERPL-SCNC: 2.9 MMOL/L (ref 3.3–5.1)
POTASSIUM SERPL-SCNC: 2.9 MMOL/L (ref 3.3–5.1)
POTASSIUM SERPL-SCNC: 3.8 MMOL/L (ref 3.3–5.1)
PROT SERPL-MCNC: 5.6 G/DL (ref 5.9–8.4)
RBC # BLD AUTO: 4.02 M/UL (ref 3.7–5.4)
SODIUM SERPL-SCNC: 138 MMOL/L (ref 136–144)
WBC # BLD AUTO: 17.9 K/UL (ref 4–11)

## 2018-03-17 PROCEDURE — 85025 COMPLETE CBC W/AUTO DIFF WBC: CPT | Performed by: HOSPITALIST

## 2018-03-17 PROCEDURE — 84132 ASSAY OF SERUM POTASSIUM: CPT | Performed by: HOSPITALIST

## 2018-03-17 PROCEDURE — 80048 BASIC METABOLIC PNL TOTAL CA: CPT | Performed by: HOSPITALIST

## 2018-03-17 PROCEDURE — 83735 ASSAY OF MAGNESIUM: CPT | Performed by: HOSPITALIST

## 2018-03-17 PROCEDURE — 80076 HEPATIC FUNCTION PANEL: CPT | Performed by: HOSPITALIST

## 2018-03-17 PROCEDURE — A4216 STERILE WATER/SALINE, 10 ML: HCPCS | Performed by: NURSE PRACTITIONER

## 2018-03-17 PROCEDURE — 87493 C DIFF AMPLIFIED PROBE: CPT | Performed by: HOSPITALIST

## 2018-03-17 RX ORDER — MAGNESIUM OXIDE 400 MG (241.3 MG MAGNESIUM) TABLET
800 TABLET ONCE
Status: COMPLETED | OUTPATIENT
Start: 2018-03-17 | End: 2018-03-17

## 2018-03-17 RX ORDER — POTASSIUM CHLORIDE 1.5 G/1.77G
20 POWDER, FOR SOLUTION ORAL ONCE
Status: COMPLETED | OUTPATIENT
Start: 2018-03-17 | End: 2018-03-17

## 2018-03-17 RX ORDER — POTASSIUM CHLORIDE 20 MEQ/1
40 TABLET, EXTENDED RELEASE ORAL ONCE
Status: COMPLETED | OUTPATIENT
Start: 2018-03-17 | End: 2018-03-17

## 2018-03-17 NOTE — PROGRESS NOTES
[facility] HOSP - [facility]    GI Progress Note      Mitchell Nadyans Patient Status:  Inpatient    1923 MRN [id number]   Location [facility] 5SW/SE Attending Jacob Kayser, MD   Hosp Day # 3 PCP Zoe Pitts MD          SUBJECTIVE: atherosclerosis. Dilated left pelvic vasculature and left ovarian vein is nonspecific. ASSESSMENT:    1. Acute pancreatitis of uncertain etiology. HL may be playing a causal role but her TG are not that high.  She is on no meds that would pred

## 2018-03-17 NOTE — PLAN OF CARE
Problem: RESPIRATORY - ADULT  Goal: Achieves optimal ventilation and oxygenation  INTERVENTIONS:  - Assess for changes in respiratory status  - Assess for changes in mentation and behavior  - Position to facilitate oxygenation and minimize respiratory effo needed  - Optimize oral hygiene and moisture  - Encourage food from home; allow for food preferences  - Enhance eating environment   Outcome: Progressing      Problem: METABOLIC/FLUID AND ELECTROLYTES - ADULT  Goal: Electrolytes maintained within normal li and sodium.  Initiate appropriate interventions as ordered   Outcome: Progressing      Problem: Impaired Functional Mobility  Goal: Achieve highest/safest level of mobility/gait  Interventions:  - Assess patient's functional ability and stability  - Promote Goal  Patient's Short Term Goal: no pain    Interventions:   Pain medications  Non pharmacological management  - See additional Care Plan goals for specific interventions   Outcome: Progressing

## 2018-03-17 NOTE — PLAN OF CARE
Problem: RESPIRATORY - ADULT  Goal: Achieves optimal ventilation and oxygenation  INTERVENTIONS:  - Assess for changes in respiratory status  - Assess for changes in mentation and behavior  - Position to facilitate oxygenation and minimize respiratory effo needed  - Optimize oral hygiene and moisture  - Encourage food from home; allow for food preferences  - Enhance eating environment   Outcome: Progressing      Problem: METABOLIC/FLUID AND ELECTROLYTES - ADULT  Goal: Electrolytes maintained within normal li and sodium.  Initiate appropriate interventions as ordered   Outcome: Progressing      Problem: Impaired Functional Mobility  Goal: Achieve highest/safest level of mobility/gait  Interventions:  - Assess patient's functional ability and stability  - Promote Goal  Patient's Short Term Goal: no pain    Interventions:   Pain medications  Non pharmacological management  - See additional Care Plan goals for specific interventions   Outcome: Progressing      Comments: Patient is alert. Was up in the chair.  Medicate

## 2018-03-17 NOTE — PROGRESS NOTES
[facility]    Progress Note    Yolis Pina Patient Status:  Inpatient    1923 MRN [id number]   Location [facility] 5SW/SE Attending Raj Landa MD   Hosp Day # 3 PCP Jennifer Arvizu MD     Subjective:     Patient appears t however. Still has a  high white count. We will continue antibiotics and close observation. We will plan continued medical management at the present time. Further recommendation pending and the patient's hospital course.   If WBC persists, may need gallbladder (based on comparison with CT imaging). However, the persistence  of activity in the expected region of the gallbladder favors a normal exam.  2. No evidence of biliary obstruction.    Results of this examination were discussed with the patient's

## 2018-03-17 NOTE — PROGRESS NOTES
[facility] Hospitalist Progress Note     CC: Hospital Follow up    PCP: Adán Feldman MD       Assessment/Plan:     Principal Problem:    Acute pancreatitis  Active Problems:    Essential hypertension    Other specified hypothyroidism    Intracranial hemorrh 0.94  -follow     Recent Intracranial hemorrhage with previous TIA  - presented w/ expressive aphasia, right arm weakness and left facial weakness x 20 mins, also with some very mild expressive aphasia on 1/23/18, all sx resolved at discharge  - 1/22/18 CT breastfeeding.     Temp:  [97.8 °F (36.6 °C)-98 °F (36.7 °C)] 98 °F (36.7 °C)  Pulse:  [75-78] 76  Resp:  [18] 18  BP: (136-150)/(57-64) 150/64      Intake/Output:    Intake/Output Summary (Last 24 hours) at 03/17/18 1146  Last data filed at 03/17/18 0800 overlying the expected region of the gallbladder (based on comparison with CT imaging). However, the persistence  of activity in the expected region of the gallbladder favors a normal exam.  2. No evidence of biliary obstruction.    Results of this examinat

## 2018-03-18 LAB
ALBUMIN SERPL BCP-MCNC: 2.4 G/DL (ref 3.5–4.8)
ALBUMIN/GLOB SERPL: 0.7 {RATIO} (ref 1–2)
ALP SERPL-CCNC: 106 U/L (ref 32–100)
ALT SERPL-CCNC: 24 U/L (ref 14–54)
ANION GAP SERPL CALC-SCNC: 8 MMOL/L (ref 0–18)
AST SERPL-CCNC: 35 U/L (ref 15–41)
BILIRUB SERPL-MCNC: 1.2 MG/DL (ref 0.3–1.2)
BUN SERPL-MCNC: 6 MG/DL (ref 8–20)
BUN/CREAT SERPL: 8.2 (ref 10–20)
C DIFF TOX B STL QL: NEGATIVE
CALCIUM SERPL-MCNC: 7.4 MG/DL (ref 8.5–10.5)
CHLORIDE SERPL-SCNC: 106 MMOL/L (ref 95–110)
CO2 SERPL-SCNC: 23 MMOL/L (ref 22–32)
CREAT SERPL-MCNC: 0.73 MG/DL (ref 0.5–1.5)
ERYTHROCYTE [DISTWIDTH] IN BLOOD BY AUTOMATED COUNT: 12.9 % (ref 11–15)
GLOBULIN PLAS-MCNC: 3.3 G/DL (ref 2.5–3.7)
GLUCOSE SERPL-MCNC: 130 MG/DL (ref 70–99)
HCT VFR BLD AUTO: 39.9 % (ref 35–48)
HGB BLD-MCNC: 13.7 G/DL (ref 12–16)
MAGNESIUM SERPL-MCNC: 1.5 MG/DL (ref 1.8–2.5)
MCH RBC QN AUTO: 31.8 PG (ref 27–32)
MCHC RBC AUTO-ENTMCNC: 34.2 G/DL (ref 32–37)
MCV RBC AUTO: 93 FL (ref 80–100)
OSMOLALITY UR CALC.SUM OF ELEC: 283 MOSM/KG (ref 275–295)
PLATELET # BLD AUTO: 154 K/UL (ref 140–400)
PMV BLD AUTO: 9.9 FL (ref 7.4–10.3)
POTASSIUM SERPL-SCNC: 3.4 MMOL/L (ref 3.3–5.1)
POTASSIUM SERPL-SCNC: 3.4 MMOL/L (ref 3.3–5.1)
PROT SERPL-MCNC: 5.7 G/DL (ref 5.9–8.4)
RBC # BLD AUTO: 4.29 M/UL (ref 3.7–5.4)
SODIUM SERPL-SCNC: 137 MMOL/L (ref 136–144)
WBC # BLD AUTO: 17.2 K/UL (ref 4–11)

## 2018-03-18 PROCEDURE — 80053 COMPREHEN METABOLIC PANEL: CPT | Performed by: HOSPITALIST

## 2018-03-18 PROCEDURE — 84132 ASSAY OF SERUM POTASSIUM: CPT | Performed by: HOSPITALIST

## 2018-03-18 PROCEDURE — 85027 COMPLETE CBC AUTOMATED: CPT | Performed by: HOSPITALIST

## 2018-03-18 PROCEDURE — 83735 ASSAY OF MAGNESIUM: CPT | Performed by: HOSPITALIST

## 2018-03-18 RX ORDER — MAGNESIUM OXIDE 400 MG (241.3 MG MAGNESIUM) TABLET
800 TABLET ONCE
Status: COMPLETED | OUTPATIENT
Start: 2018-03-18 | End: 2018-03-18

## 2018-03-18 NOTE — PROGRESS NOTES
[facility]    Progress Note    Carmen Page Patient Status:  Inpatient    1923 MRN [id number]   Location [facility] 5SW/SE Attending Main Orr MD   Hosp Day # 4 PCP Natalia Clements MD     Subjective:     Patient appears t close observation. Further recommendation pending and the patient's hospital course.   MRI ordered by Dr Lobito Chowdhury MD [facility]  3/18/2018  11:34 AM      '    Meds:     • potassium chloride 40mEq IVPB (periph

## 2018-03-18 NOTE — PLAN OF CARE
Problem: RESPIRATORY - ADULT  Goal: Achieves optimal ventilation and oxygenation  INTERVENTIONS:  - Assess for changes in respiratory status  - Assess for changes in mentation and behavior  - Position to facilitate oxygenation and minimize respiratory effo needed  - Optimize oral hygiene and moisture  - Encourage food from home; allow for food preferences  - Enhance eating environment   Outcome: Progressing      Problem: METABOLIC/FLUID AND ELECTROLYTES - ADULT  Goal: Electrolytes maintained within normal li and sodium.  Initiate appropriate interventions as ordered   Outcome: Progressing      Problem: Impaired Functional Mobility  Goal: Achieve highest/safest level of mobility/gait  Interventions:  - Assess patient's functional ability and stability  - Promote Goal  Patient's Short Term Goal: no pain    Interventions:   Pain medications  Non pharmacological management  - See additional Care Plan goals for specific interventions   Outcome: Progressing      Comments: Patient is alert. Denies pain.  Was sitting up i

## 2018-03-18 NOTE — PROGRESS NOTES
[facility]    GI Progress Note      Caity Silva Patient Status:  Inpatient    1923 MRN [id number]   Location [facility] 5SW/SE Attending Rachelle Lucia MD   Hosp Day # 4 PCP Violet Jara MD          SUBJECTIVE: atherosclerosis.     Dilated left pelvic vasculature and left ovarian vein is nonspecific.       PROCEDURE:  NM GB HEPATOBILILARY SCAN ([id number]=[id number])     COMPARISON: [address] [place] [address] (OZN=[id number]), 3/14/2018, 1:47.   [employer]

## 2018-03-18 NOTE — PROGRESS NOTES
[facility] Hospitalist Progress Note     CC: Hospital Follow up    PCP: Sarah Beth Gibbs MD       Assessment/Plan:     Principal Problem:    Acute pancreatitis  Active Problems:    Essential hypertension    Other specified hypothyroidism    Intracranial hemorrh 3  -GFR 52, Cr 0.94  -follow     Recent Intracranial hemorrhage with previous TIA  - presented w/ expressive aphasia, right arm weakness and left facial weakness x 20 mins, also with some very mild expressive aphasia on 1/23/18, all sx resolved at [address] [facility] (36.8 °C)] 97.7 °F (36.5 °C)  Pulse:  [74-84] 74  Resp:  [18] 18  BP: (121-145)/(58-72) 142/67      Intake/Output:    Intake/Output Summary (Last 24 hours) at 03/18/18 1056  Last data filed at 03/18/18 8752   Gross per 24 hour   Intake             2912 ml somewhat complicated by excreted activity in the intestinal tract overlying the expected region of the gallbladder (based on comparison with CT imaging).  However, the persistence  of activity in the expected region of the gallbladder favors a normal exam.

## 2018-03-19 ENCOUNTER — APPOINTMENT (OUTPATIENT)
Dept: MRI IMAGING | Facility: HOSPITAL | Age: 83
DRG: 438 | End: 2018-03-19
Attending: INTERNAL MEDICINE
Payer: MEDICARE

## 2018-03-19 LAB
ALBUMIN SERPL BCP-MCNC: 2.2 G/DL (ref 3.5–4.8)
ALBUMIN/GLOB SERPL: 0.7 {RATIO} (ref 1–2)
ALP SERPL-CCNC: 130 U/L (ref 32–100)
ALT SERPL-CCNC: 24 U/L (ref 14–54)
ANION GAP SERPL CALC-SCNC: 11 MMOL/L (ref 0–18)
AST SERPL-CCNC: 29 U/L (ref 15–41)
BASOPHILS # BLD: 0 K/UL (ref 0–0.2)
BASOPHILS NFR BLD: 0 %
BILIRUB SERPL-MCNC: 1 MG/DL (ref 0.3–1.2)
BUN SERPL-MCNC: 5 MG/DL (ref 8–20)
BUN/CREAT SERPL: 6.4 (ref 10–20)
CALCIUM SERPL-MCNC: 7.9 MG/DL (ref 8.5–10.5)
CHLORIDE SERPL-SCNC: 102 MMOL/L (ref 95–110)
CO2 SERPL-SCNC: 24 MMOL/L (ref 22–32)
CREAT SERPL-MCNC: 0.78 MG/DL (ref 0.5–1.5)
EOSINOPHIL # BLD: 0 K/UL (ref 0–0.7)
EOSINOPHIL NFR BLD: 0 %
ERYTHROCYTE [DISTWIDTH] IN BLOOD BY AUTOMATED COUNT: 13 % (ref 11–15)
ERYTHROCYTE [DISTWIDTH] IN BLOOD BY AUTOMATED COUNT: 13 % (ref 11–15)
GGT SERPL-CCNC: 118 U/L (ref 7–50)
GLOBULIN PLAS-MCNC: 3.1 G/DL (ref 2.5–3.7)
GLUCOSE SERPL-MCNC: 111 MG/DL (ref 70–99)
HCT VFR BLD AUTO: 39.1 % (ref 35–48)
HCT VFR BLD AUTO: 39.1 % (ref 35–48)
HGB BLD-MCNC: 13.3 G/DL (ref 12–16)
HGB BLD-MCNC: 13.3 G/DL (ref 12–16)
LYMPHOCYTES # BLD: 0.8 K/UL (ref 1–4)
LYMPHOCYTES NFR BLD: 5 %
MAGNESIUM SERPL-MCNC: 1.6 MG/DL (ref 1.8–2.5)
MCH RBC QN AUTO: 31.6 PG (ref 27–32)
MCH RBC QN AUTO: 31.6 PG (ref 27–32)
MCHC RBC AUTO-ENTMCNC: 33.9 G/DL (ref 32–37)
MCHC RBC AUTO-ENTMCNC: 33.9 G/DL (ref 32–37)
MCV RBC AUTO: 93.4 FL (ref 80–100)
MCV RBC AUTO: 93.4 FL (ref 80–100)
MONOCYTES # BLD: 1.4 K/UL (ref 0–1)
MONOCYTES NFR BLD: 9 %
NEUTROPHILS # BLD AUTO: 13 K/UL (ref 1.8–7.7)
NEUTROPHILS NFR BLD: 81 %
NEUTS BAND NFR BLD: 5 %
OSMOLALITY UR CALC.SUM OF ELEC: 282 MOSM/KG (ref 275–295)
PLATELET # BLD AUTO: 179 K/UL (ref 140–400)
PLATELET # BLD AUTO: 179 K/UL (ref 140–400)
PMV BLD AUTO: 9.9 FL (ref 7.4–10.3)
PMV BLD AUTO: 9.9 FL (ref 7.4–10.3)
POTASSIUM SERPL-SCNC: 3.1 MMOL/L (ref 3.3–5.1)
POTASSIUM SERPL-SCNC: 3.1 MMOL/L (ref 3.3–5.1)
POTASSIUM SERPL-SCNC: 4.8 MMOL/L (ref 3.3–5.1)
PROT SERPL-MCNC: 5.3 G/DL (ref 5.9–8.4)
RBC # BLD AUTO: 4.19 M/UL (ref 3.7–5.4)
RBC # BLD AUTO: 4.19 M/UL (ref 3.7–5.4)
SODIUM SERPL-SCNC: 137 MMOL/L (ref 136–144)
WBC # BLD AUTO: 15.1 K/UL (ref 4–11)
WBC # BLD AUTO: 15.1 K/UL (ref 4–11)

## 2018-03-19 PROCEDURE — 97530 THERAPEUTIC ACTIVITIES: CPT

## 2018-03-19 PROCEDURE — 83735 ASSAY OF MAGNESIUM: CPT | Performed by: HOSPITALIST

## 2018-03-19 PROCEDURE — 82977 ASSAY OF GGT: CPT | Performed by: NURSE PRACTITIONER

## 2018-03-19 PROCEDURE — 85025 COMPLETE CBC W/AUTO DIFF WBC: CPT | Performed by: INTERNAL MEDICINE

## 2018-03-19 PROCEDURE — 85027 COMPLETE CBC AUTOMATED: CPT | Performed by: HOSPITALIST

## 2018-03-19 PROCEDURE — A4216 STERILE WATER/SALINE, 10 ML: HCPCS | Performed by: NURSE PRACTITIONER

## 2018-03-19 PROCEDURE — 97116 GAIT TRAINING THERAPY: CPT

## 2018-03-19 PROCEDURE — 85027 COMPLETE CBC AUTOMATED: CPT | Performed by: INTERNAL MEDICINE

## 2018-03-19 PROCEDURE — 84132 ASSAY OF SERUM POTASSIUM: CPT | Performed by: HOSPITALIST

## 2018-03-19 PROCEDURE — 80177 DRUG SCRN QUAN LEVETIRACETAM: CPT | Performed by: HOSPITALIST

## 2018-03-19 PROCEDURE — 97535 SELF CARE MNGMENT TRAINING: CPT

## 2018-03-19 PROCEDURE — 85007 BL SMEAR W/DIFF WBC COUNT: CPT | Performed by: INTERNAL MEDICINE

## 2018-03-19 PROCEDURE — 80053 COMPREHEN METABOLIC PANEL: CPT | Performed by: HOSPITALIST

## 2018-03-19 RX ORDER — MAGNESIUM OXIDE 400 MG (241.3 MG MAGNESIUM) TABLET
400 TABLET ONCE
Status: COMPLETED | OUTPATIENT
Start: 2018-03-19 | End: 2018-03-19

## 2018-03-19 RX ORDER — POTASSIUM CHLORIDE 20 MEQ/1
40 TABLET, EXTENDED RELEASE ORAL EVERY 4 HOURS
Status: COMPLETED | OUTPATIENT
Start: 2018-03-19 | End: 2018-03-19

## 2018-03-19 NOTE — PROGRESS NOTES
[facility]  Gastroenterology Progress Note    Jian Profit Patient Status:  Inpatient    1923 MRN [id number]   Location [facility] 5SW/SE Attending Deette Sacks,  [facility] Se Day # 5 PCP Antony Rankin MD     Subjective:  Excell Loose consistent with passage of a tiny common duct stone. Lipase now normal and the patient denies pain and has no tenderness on examination. White blood cell count continues to trend downward, now 15. Patient in other respects clinically resolving.   MRI tod

## 2018-03-19 NOTE — OCCUPATIONAL THERAPY NOTE
OCCUPATIONAL THERAPY TREATMENT NOTE - INPATIENT        Room Number: 537/537-A           Presenting Problem: acute pancreatitis    Problem List  Principal Problem:    Acute pancreatitis  Active Problems:    Essential hypertension    Other specified hypothyr promotion; Body mechanics;Repositioning     ACTIVITY TOLERANCE  Patient did experience mild SOB with dynamic activity; 02 on RA 91%; VC for deep breathing strategies and 02 improved to 94-96%    ACTIVITIES OF DAILY LIVING ASSESSMENT  AM-PAC ‘6-Clicks’ Inpat

## 2018-03-19 NOTE — PROGRESS NOTES
[facility] Hospitalist Team  Progress Note    Kentrellconnor Santos Patient Status:  Inpatient    1923 MRN [id number]   Location [facility] 5SW/SE Attending Apryl Muñiz MD   Hosp Day # 5 PCP Jordon Phillips MD     CC: Follow Up  PCP: Ehsan Whatley follow     Recent Intracranial hemorrhage with previous TIA  - presented w/ expressive aphasia, right arm weakness and left facial weakness x 20 mins, also with some very mild expressive aphasia on 1/23/18, all sx resolved at discharge  - 1/22/18 CT- small BS+  EXTREMITIES: no edema, no calf tenderness, SCD in place    DIAGNOSTIC DATA:   Labs:     Recent Labs   Lab  03/17/18   0534  03/18/18   0520  03/19/18   0541   WBC  17.9*  17.2*  15.1*  15.1*   HGB  12.9  13.7  13.3  13.3   MCV  94.1  93.0  93.4  93.4 Location: Right arm)   Pulse 93   Temp 97.8 °F (36.6 °C) (Oral)   Resp 18   Ht 144.8 cm (4' 9\")   Wt 122 lb (55.3 kg)   SpO2 93%   BMI 26.40 kg/m²     Exam   GEN: elderly female in NAD  HEENT: EOMI, PERRLA  Neck: Supple, no JVD  Pulm: CTAB, no crackles or worsening WBC count  -follow     Hypokalemia  -replete via protocol     Elevated BS  -A1C 5.8     Microscopic Hematuria  -RBC 11 on UA  -follow up UA as outpt     Chronic T9 Compression Fx  -noted on CT A/P     CKD Stage 3  -stable, follow     Recent Intra

## 2018-03-19 NOTE — PLAN OF CARE
Problem: RESPIRATORY - ADULT  Goal: Achieves optimal ventilation and oxygenation  INTERVENTIONS:  - Assess for changes in respiratory status  - Assess for changes in mentation and behavior  - Position to facilitate oxygenation and minimize respiratory effo needed  - Optimize oral hygiene and moisture  - Encourage food from home; allow for food preferences  - Enhance eating environment   Outcome: Progressing      Problem: METABOLIC/FLUID AND ELECTROLYTES - ADULT  Goal: Electrolytes maintained within normal li and sodium.  Initiate appropriate interventions as ordered   Outcome: Progressing      Problem: Impaired Functional Mobility  Goal: Achieve highest/safest level of mobility/gait  Interventions:  - Assess patient's functional ability and stability  - Promote Goal  Patient's Short Term Goal: no pain    Interventions:   Pain medications  Non pharmacological management  - See additional Care Plan goals for specific interventions   Outcome: Progressing      Comments: Patient came back from MRI, made clean and comf

## 2018-03-19 NOTE — PHYSICAL THERAPY NOTE
PHYSICAL THERAPY TREATMENT NOTE - INPATIENT     Room Number: 537/537-A       Presenting Problem: pancreatis    Problem List  Principal Problem:    Acute pancreatitis  Active Problems:    Essential hypertension    Other specified hypothyroidism    Intracran fall risk prevention/ use of call light / ankle pumps/ marching / Rosa Maria Bias / glut sets ----handout provided . Pt verbalized understanding yet reinforcment recommended.       Therapy would recommend need for 24 hr support and care at d/c .  SW to work with pt a Moving from lying on back to sitting on the side of the bed?: A Little   How much help from another person does the patient currently need. ..   -   Moving to and from a bed to a chair (including a wheelchair)?: A Little   -   Need to walk in hospital room?

## 2018-03-20 ENCOUNTER — APPOINTMENT (OUTPATIENT)
Dept: MRI IMAGING | Facility: HOSPITAL | Age: 83
DRG: 438 | End: 2018-03-20
Attending: INTERNAL MEDICINE
Payer: MEDICARE

## 2018-03-20 ENCOUNTER — APPOINTMENT (OUTPATIENT)
Dept: INTERVENTIONAL RADIOLOGY/VASCULAR | Facility: HOSPITAL | Age: 83
DRG: 438 | End: 2018-03-20
Attending: CLINICAL NURSE SPECIALIST
Payer: MEDICARE

## 2018-03-20 LAB
ALBUMIN SERPL BCP-MCNC: 2.3 G/DL (ref 3.5–4.8)
ALBUMIN/GLOB SERPL: 0.7 {RATIO} (ref 1–2)
ALP SERPL-CCNC: 157 U/L (ref 32–100)
ALT SERPL-CCNC: 22 U/L (ref 14–54)
ANION GAP SERPL CALC-SCNC: 6 MMOL/L (ref 0–18)
AST SERPL-CCNC: 27 U/L (ref 15–41)
BILIRUB SERPL-MCNC: 0.9 MG/DL (ref 0.3–1.2)
BUN SERPL-MCNC: 8 MG/DL (ref 8–20)
BUN/CREAT SERPL: 9.5 (ref 10–20)
CALCIUM SERPL-MCNC: 8.2 MG/DL (ref 8.5–10.5)
CHLORIDE SERPL-SCNC: 104 MMOL/L (ref 95–110)
CO2 SERPL-SCNC: 26 MMOL/L (ref 22–32)
CREAT SERPL-MCNC: 0.84 MG/DL (ref 0.5–1.5)
ERYTHROCYTE [DISTWIDTH] IN BLOOD BY AUTOMATED COUNT: 13.1 % (ref 11–15)
GLOBULIN PLAS-MCNC: 3.4 G/DL (ref 2.5–3.7)
GLUCOSE SERPL-MCNC: 114 MG/DL (ref 70–99)
HCT VFR BLD AUTO: 39.6 % (ref 35–48)
HGB BLD-MCNC: 13.6 G/DL (ref 12–16)
INR BLD: 1.1 (ref 0.9–1.2)
LEVETIRACETAM (KEPPRA): 15 UG/ML
MAGNESIUM SERPL-MCNC: 1.7 MG/DL (ref 1.8–2.5)
MCH RBC QN AUTO: 32.2 PG (ref 27–32)
MCHC RBC AUTO-ENTMCNC: 34.4 G/DL (ref 32–37)
MCV RBC AUTO: 93.9 FL (ref 80–100)
OSMOLALITY UR CALC.SUM OF ELEC: 281 MOSM/KG (ref 275–295)
PLATELET # BLD AUTO: 233 K/UL (ref 140–400)
PMV BLD AUTO: 9.5 FL (ref 7.4–10.3)
POTASSIUM SERPL-SCNC: 4.4 MMOL/L (ref 3.3–5.1)
PROT SERPL-MCNC: 5.7 G/DL (ref 5.9–8.4)
PROTHROMBIN TIME: 13.8 SECONDS (ref 11.8–14.5)
RBC # BLD AUTO: 4.22 M/UL (ref 3.7–5.4)
SODIUM SERPL-SCNC: 136 MMOL/L (ref 136–144)
WBC # BLD AUTO: 17.9 K/UL (ref 4–11)

## 2018-03-20 PROCEDURE — 87070 CULTURE OTHR SPECIMN AEROBIC: CPT | Performed by: CLINICAL NURSE SPECIALIST

## 2018-03-20 PROCEDURE — 85610 PROTHROMBIN TIME: CPT | Performed by: CLINICAL NURSE SPECIALIST

## 2018-03-20 PROCEDURE — 47490 INCISION OF GALLBLADDER: CPT

## 2018-03-20 PROCEDURE — 85027 COMPLETE CBC AUTOMATED: CPT | Performed by: HOSPITALIST

## 2018-03-20 PROCEDURE — 87116 MYCOBACTERIA CULTURE: CPT | Performed by: CLINICAL NURSE SPECIALIST

## 2018-03-20 PROCEDURE — 85025 COMPLETE CBC W/AUTO DIFF WBC: CPT | Performed by: NURSE PRACTITIONER

## 2018-03-20 PROCEDURE — A4216 STERILE WATER/SALINE, 10 ML: HCPCS

## 2018-03-20 PROCEDURE — 87205 SMEAR GRAM STAIN: CPT | Performed by: HOSPITALIST

## 2018-03-20 PROCEDURE — 87075 CULTR BACTERIA EXCEPT BLOOD: CPT | Performed by: HOSPITALIST

## 2018-03-20 PROCEDURE — 87070 CULTURE OTHR SPECIMN AEROBIC: CPT | Performed by: HOSPITALIST

## 2018-03-20 PROCEDURE — 0F9430Z DRAINAGE OF GALLBLADDER WITH DRAINAGE DEVICE, PERCUTANEOUS APPROACH: ICD-10-PCS | Performed by: RADIOLOGY

## 2018-03-20 PROCEDURE — 83735 ASSAY OF MAGNESIUM: CPT | Performed by: HOSPITALIST

## 2018-03-20 PROCEDURE — 80053 COMPREHEN METABOLIC PANEL: CPT | Performed by: HOSPITALIST

## 2018-03-20 PROCEDURE — 76376 3D RENDER W/INTRP POSTPROCES: CPT | Performed by: INTERNAL MEDICINE

## 2018-03-20 PROCEDURE — 87206 SMEAR FLUORESCENT/ACID STAI: CPT | Performed by: CLINICAL NURSE SPECIALIST

## 2018-03-20 PROCEDURE — A9575 INJ GADOTERATE MEGLUMI 0.1ML: HCPCS | Performed by: HOSPITALIST

## 2018-03-20 PROCEDURE — 74183 MRI ABD W/O CNTR FLWD CNTR: CPT | Performed by: INTERNAL MEDICINE

## 2018-03-20 RX ORDER — MIDAZOLAM HYDROCHLORIDE 1 MG/ML
INJECTION INTRAMUSCULAR; INTRAVENOUS
Status: DISCONTINUED
Start: 2018-03-20 | End: 2018-03-20 | Stop reason: WASHOUT

## 2018-03-20 RX ORDER — CALCIUM CARBONATE 200(500)MG
500 TABLET,CHEWABLE ORAL
Status: DISCONTINUED | OUTPATIENT
Start: 2018-03-20 | End: 2018-03-21

## 2018-03-20 RX ORDER — ACETAMINOPHEN 325 MG/1
650 TABLET ORAL EVERY 6 HOURS PRN
Status: DISCONTINUED | OUTPATIENT
Start: 2018-03-20 | End: 2018-03-27

## 2018-03-20 RX ORDER — MAGNESIUM OXIDE 400 MG (241.3 MG MAGNESIUM) TABLET
400 TABLET ONCE
Status: COMPLETED | OUTPATIENT
Start: 2018-03-20 | End: 2018-03-20

## 2018-03-20 RX ORDER — FUROSEMIDE 10 MG/ML
20 INJECTION INTRAMUSCULAR; INTRAVENOUS ONCE
Status: COMPLETED | OUTPATIENT
Start: 2018-03-20 | End: 2018-03-20

## 2018-03-20 RX ORDER — 0.9 % SODIUM CHLORIDE 0.9 %
VIAL (ML) INJECTION
Status: DISCONTINUED
Start: 2018-03-20 | End: 2018-03-21

## 2018-03-20 RX ORDER — DIPHENHYDRAMINE HYDROCHLORIDE 50 MG/ML
INJECTION INTRAMUSCULAR; INTRAVENOUS
Status: COMPLETED
Start: 2018-03-20 | End: 2018-03-20

## 2018-03-20 RX ORDER — LIDOCAINE HYDROCHLORIDE 20 MG/ML
INJECTION, SOLUTION EPIDURAL; INFILTRATION; INTRACAUDAL; PERINEURAL
Status: COMPLETED
Start: 2018-03-20 | End: 2018-03-20

## 2018-03-20 RX ORDER — SODIUM CHLORIDE 9 MG/ML
INJECTION, SOLUTION INTRAVENOUS
Status: COMPLETED
Start: 2018-03-20 | End: 2018-03-20

## 2018-03-20 RX ORDER — SODIUM CHLORIDE 9 MG/ML
INJECTION, SOLUTION INTRAVENOUS
Status: DISPENSED
Start: 2018-03-20 | End: 2018-03-20

## 2018-03-20 NOTE — PROGRESS NOTES
[facility]    Progress Note    Jian Profit Patient Status:  Inpatient    1923 MRN [id number]   Location [facility] 5SW/SE Attending Justus Rivera MD   Hosp Day # 5 PCP Antony Rankin MD     Subjective:     Patient appears t close observation. Further recommendation pending and the patient's hospital course. MRI ordered by Dr Sujatha Zepeda awaiting completion. Apparently had an MRI problem today.     Gabriela Castillo MD [facility]  General Surgery  [facility]  3/19/2018  8:

## 2018-03-20 NOTE — PHYSICAL THERAPY NOTE
Chart reviewed    RN approve participation    Pt received up in chair    dtr present in room      Pt declined participation in therapy this AM    \" My stomach is burning , I had an MRI this AM , my second one , to fatgued to participate.      Discussed wit

## 2018-03-20 NOTE — PHYSICAL THERAPY NOTE
Chart reviewed      Follow up this PM  Second attempt    Note  Pt with need for       Procedure: Percutaneous cholecystostomy tube placement, abdominal abscess drain placement     Pre-Procedure Diagnosis: Closed compression fracture of thoracic vertebra, i

## 2018-03-20 NOTE — PROGRESS NOTES
[facility]    Progress Note    Jeri Schools Patient Status:  Inpatient    1923 MRN [id number]   Location [facility] 5SW/SE Attending Shirley Rhoades MD   Hosp Day # 6 PCP Susu Robertson MD     Subjective:     Patient appears t feels well and generally improving, will plan IR drainage of abscess and drainage of Gallbladder as well. I spoke with family, they want to avoid surgery if at all possible.     I explained we will tx medically for present time, it is poss pt may eventuall these parameters: 1. Adjacent to the gallbladder, there is a complex, loculated fluid collection measuring up to 5.3 cm. This is suspicious for abscess. 2. Additional perihepatic ascites is demonstrated.   3. Minimal residual peripancreatic edema, suggesti

## 2018-03-20 NOTE — PROGRESS NOTES
[facility] Hospitalist Team  Progress Note    [place] Patient Status:  Inpatient    1923 MRN [id number]   Location [facility] 5SW/SE Attending Manjit Hernandez MD   Hosp Day # 6 PCP Eliana Murrell MD     CC: Follow Up  PCP: Jese Liz -follow     Hypokalemia  -repleted via protocol     Elevated BS  -A1C 5.8     Microscopic Hematuria  -RBC 11 on UA  -follow up UA as outpt     Chronic T9 Compression Fx  -noted on CT A/P     CKD Stage 3  -stable, follow     Recent Intracranial hemorrhage weight 122 lb (55.3 kg), SpO2 95 %, not currently breastfeeding.     GENERAL: no apparent distress  NEURO: A/A Ox3  RESP: non labored, CTA  CARDIO: Regular, no murmur  ABD: soft, NT, ND, BS+  EXTREMITIES: no edema, no calf tenderness, SCD in place    [facility] metoprolol Tartrate (LOPRESSOR) tab 25 mg 25 mg Oral 2x Daily(Beta Blocker)   Piperacillin Sod-Tazobactam So (ZOSYN) 3.375 g in dextrose 5 % 100 mL ADD-vantage 3.375 g Intravenous Q8H         IMAGING     Mri Abdomen&mrcp W/3d (w+w0)(cpt=74183/01873)    R prior hemorrhage, AC held, keppra started due abnl EEG who now presents with abdominal pain radiating to back and shoulders, decreased po intake.  Pt found to have Acute pancreatitis, etiology unknown with negative HIDA, MRCP with noted abscess adjacent to left frontal lobe   -1/24/18 MRI with no acute stroked, chronic in basal ganglia and scattered hemorrhagic foci  -Carotid US without sig finding  -echo with normal EF, grade 1 DD, mild AI  -?  Hx of afib, but none noted on tele last admission  -Pt to be off

## 2018-03-20 NOTE — PROCEDURES
[facility] HOSP - [facility]  Procedure Note    Silvia Hilliard Patient Status:  Inpatient    1923 MRN [id number]   Location [facility] Attending Karin Guillen MD   Hosp Day # 6 PCP Clare Chua MD     Procedure:

## 2018-03-21 LAB
ALBUMIN SERPL BCP-MCNC: 2.3 G/DL (ref 3.5–4.8)
ALBUMIN/GLOB SERPL: 0.7 {RATIO} (ref 1–2)
ALP SERPL-CCNC: 149 U/L (ref 32–100)
ALT SERPL-CCNC: 24 U/L (ref 14–54)
ANION GAP SERPL CALC-SCNC: 8 MMOL/L (ref 0–18)
AST SERPL-CCNC: 29 U/L (ref 15–41)
BASOPHILS # BLD: 0.2 K/UL (ref 0–0.2)
BASOPHILS NFR BLD: 1 %
BILIRUB SERPL-MCNC: 0.7 MG/DL (ref 0.3–1.2)
BUN SERPL-MCNC: 12 MG/DL (ref 8–20)
BUN/CREAT SERPL: 10 (ref 10–20)
CALCIUM SERPL-MCNC: 7.8 MG/DL (ref 8.5–10.5)
CHLORIDE SERPL-SCNC: 96 MMOL/L (ref 95–110)
CO2 SERPL-SCNC: 27 MMOL/L (ref 22–32)
CREAT SERPL-MCNC: 1.2 MG/DL (ref 0.5–1.5)
EOSINOPHIL # BLD: 0.2 K/UL (ref 0–0.7)
EOSINOPHIL NFR BLD: 1 %
ERYTHROCYTE [DISTWIDTH] IN BLOOD BY AUTOMATED COUNT: 12.6 % (ref 11–15)
GLOBULIN PLAS-MCNC: 3.3 G/DL (ref 2.5–3.7)
GLUCOSE SERPL-MCNC: 106 MG/DL (ref 70–99)
HCT VFR BLD AUTO: 39.5 % (ref 35–48)
HGB BLD-MCNC: 13.4 G/DL (ref 12–16)
LYMPHOCYTES # BLD: 1.3 K/UL (ref 1–4)
LYMPHOCYTES NFR BLD: 9 %
MAGNESIUM SERPL-MCNC: 1.8 MG/DL (ref 1.8–2.5)
MCH RBC QN AUTO: 31.7 PG (ref 27–32)
MCHC RBC AUTO-ENTMCNC: 33.9 G/DL (ref 32–37)
MCV RBC AUTO: 93.7 FL (ref 80–100)
METAMYELOCYTES # BLD MANUAL: 0.15 K/UL
METAMYELOCYTES NFR BLD: 1 %
MONOCYTES # BLD: 0.6 K/UL (ref 0–1)
MONOCYTES NFR BLD: 4 %
NEUTROPHILS # BLD AUTO: 12.3 K/UL (ref 1.8–7.7)
NEUTROPHILS NFR BLD: 81 %
NEUTS BAND NFR BLD: 3 %
OSMOLALITY UR CALC.SUM OF ELEC: 272 MOSM/KG (ref 275–295)
PLATELET # BLD AUTO: 256 K/UL (ref 140–400)
PMV BLD AUTO: 9.6 FL (ref 7.4–10.3)
POTASSIUM SERPL-SCNC: 3.2 MMOL/L (ref 3.3–5.1)
POTASSIUM SERPL-SCNC: 5.7 MMOL/L (ref 3.3–5.1)
PROT SERPL-MCNC: 5.6 G/DL (ref 5.9–8.4)
RBC # BLD AUTO: 4.21 M/UL (ref 3.7–5.4)
SODIUM SERPL-SCNC: 131 MMOL/L (ref 136–144)
WBC # BLD AUTO: 14.7 K/UL (ref 4–11)

## 2018-03-21 PROCEDURE — 80053 COMPREHEN METABOLIC PANEL: CPT | Performed by: NURSE PRACTITIONER

## 2018-03-21 PROCEDURE — 84132 ASSAY OF SERUM POTASSIUM: CPT | Performed by: HOSPITALIST

## 2018-03-21 PROCEDURE — 83735 ASSAY OF MAGNESIUM: CPT | Performed by: HOSPITALIST

## 2018-03-21 PROCEDURE — 85027 COMPLETE CBC AUTOMATED: CPT | Performed by: NURSE PRACTITIONER

## 2018-03-21 PROCEDURE — A4216 STERILE WATER/SALINE, 10 ML: HCPCS

## 2018-03-21 PROCEDURE — 85007 BL SMEAR W/DIFF WBC COUNT: CPT | Performed by: NURSE PRACTITIONER

## 2018-03-21 PROCEDURE — 85025 COMPLETE CBC W/AUTO DIFF WBC: CPT | Performed by: NURSE PRACTITIONER

## 2018-03-21 RX ORDER — MAGNESIUM OXIDE 400 MG (241.3 MG MAGNESIUM) TABLET
400 TABLET ONCE
Status: COMPLETED | OUTPATIENT
Start: 2018-03-21 | End: 2018-03-21

## 2018-03-21 RX ORDER — ARIPIPRAZOLE 15 MG/1
40 TABLET ORAL EVERY 4 HOURS
Status: COMPLETED | OUTPATIENT
Start: 2018-03-21 | End: 2018-03-21

## 2018-03-21 RX ORDER — 0.9 % SODIUM CHLORIDE 0.9 %
VIAL (ML) INJECTION
Status: COMPLETED
Start: 2018-03-21 | End: 2018-03-21

## 2018-03-21 RX ORDER — CALCIUM CARBONATE 200(500)MG
500 TABLET,CHEWABLE ORAL EVERY 6 HOURS PRN
Status: DISCONTINUED | OUTPATIENT
Start: 2018-03-21 | End: 2018-03-27

## 2018-03-21 NOTE — PROGRESS NOTES
[facility] Hospitalist Team  Progress Note    [place] Dilling Patient Status:  Inpatient    1923 MRN [id number]   Location [facility] 5SW/SE Attending Evelyn Chavez MD   Hosp Day # 7 PCP Esthela Erickson MD     CC: Follow Up  PCP: Susu Jessica Cr     Diarrhea  - has been having loose stools prior to hospitalization, had 2 loose BM today  - C.  Diff negative     Delirium-resolved   -2/2 infection   -follow     Hypokalemia  -repleted via protocol     Elevated BS  -A1C 5.8     Microscopic Hematuria 97.3 °F (36.3 °C), temperature source Axillary, resp. rate 20, height 144.8 cm (4' 9\"), weight 121 lb 14.6 oz (55.3 kg), SpO2 96 %, not currently breastfeeding.     GENERAL: no apparent distress  NEURO: A/A Ox3  RESP: non labored, CTA  CARDIO: Regular, no 500 mg 500 mg Oral BID   Levothyroxine Sodium (SYNTHROID) tab 50 mcg 50 mcg Oral Before breakfast   metoprolol Tartrate (LOPRESSOR) tab 25 mg 25 mg Oral 2x Daily(Beta Blocker)         IMAGING     Ir Cholecystostomy    Result Date: 3/20/2018  CONCLUSION:  1 Wt 121 lb 14.6 oz (55.3 kg)   SpO2 96%   BMI 26.38 kg/m²     Exam   GEN: elderly female in NAD  HEENT: EOMI, PERRLA  Neck: Supple, no JVD  Pulm: CTAB, no crackles or wheezes  CV: RRR, no murmurs   ABD: Soft, mild epigastric tenderness, non-distended, +BS TIA  - presented w/ expressive aphasia, right arm weakness and left facial weakness x 20 mins, also with some very mild expressive aphasia on 1/23/18, all sx resolved at discharge  - 1/22/18 CT- small 4 mm, left frontal lobe   -1/24/18 MRI with no acute st

## 2018-03-21 NOTE — PLAN OF CARE
Problem: RESPIRATORY - ADULT  Goal: Achieves optimal ventilation and oxygenation  INTERVENTIONS:  - Assess for changes in respiratory status  - Assess for changes in mentation and behavior  - Position to facilitate oxygenation and minimize respiratory effo needed  - Optimize oral hygiene and moisture  - Encourage food from home; allow for food preferences  - Enhance eating environment   Outcome: Progressing      Problem: METABOLIC/FLUID AND ELECTROLYTES - ADULT  Goal: Electrolytes maintained within normal li and sodium.  Initiate appropriate interventions as ordered   Outcome: Progressing      Problem: Impaired Functional Mobility  Goal: Achieve highest/safest level of mobility/gait  Interventions:  - Assess patient's functional ability and stability  - Promote Progressing    Goal: Patient/Family Short Term Goal  Patient's Short Term Goal: no pain    Interventions:   Pain medications  Non pharmacological management  - See additional Care Plan goals for specific interventions   Outcome: Progressing      Comments:

## 2018-03-21 NOTE — OCCUPATIONAL THERAPY NOTE
Attempted to see pt for OT session, however pt on toilet for past 10 minutes. Needing more time. Nursing in bathroom. Will continue to follow.

## 2018-03-21 NOTE — PLAN OF CARE
Problem: RESPIRATORY - ADULT  Goal: Achieves optimal ventilation and oxygenation  INTERVENTIONS:  - Assess for changes in respiratory status  - Assess for changes in mentation and behavior  - Position to facilitate oxygenation and minimize respiratory effo areas of redness and/or skin breakdown  - Initiate interventions, skin care algorithm/standards of care as needed   Outcome: Progressing  intact    Problem: MUSCULOSKELETAL - ADULT  Goal: Return mobility to safest level of function  INTERVENTIONS:  - Asses the patient's plan of care  Interventions:  - What would you like us to know as we care for you? I am hard of hearing and have hearing aids. Keep my daughters informed of plan of care.   - Provide timely, complete, and accurate information to patient/family

## 2018-03-21 NOTE — PROGRESS NOTES
[facility]  Gastroenterology Progress Note    Makenziealphonso Garcia Patient Status:  Inpatient    1923 MRN [id number]   Location [facility] 5SW/SE Attending Ana Rosa Ellis MD   Hosp Day # 7 PCP Gui Hill MD     Subjective:  [address] Date: 3/20/2018  CONCLUSION:  Severely motion-compromised examination. Within these parameters: 1. Adjacent to the gallbladder, there is a complex, loculated fluid collection measuring up to 5.3 cm. This is suspicious for abscess.   2. Additional perihepati

## 2018-03-21 NOTE — PLAN OF CARE
Problem: RESPIRATORY - ADULT  Goal: Achieves optimal ventilation and oxygenation  INTERVENTIONS:  - Assess for changes in respiratory status  - Assess for changes in mentation and behavior  - Position to facilitate oxygenation and minimize respiratory effo needed  - Optimize oral hygiene and moisture  - Encourage food from home; allow for food preferences  - Enhance eating environment   Outcome: Progressing      Problem: METABOLIC/FLUID AND ELECTROLYTES - ADULT  Goal: Electrolytes maintained within normal li and sodium.  Initiate appropriate interventions as ordered   Outcome: Progressing      Problem: Impaired Functional Mobility  Goal: Achieve highest/safest level of mobility/gait  Interventions:  - Assess patient's functional ability and stability  - Promote Progressing    Goal: Patient/Family Short Term Goal  Patient's Short Term Goal: no pain    Interventions:   Pain medications  Non pharmacological management  - See additional Care Plan goals for specific interventions   Outcome: Progressing      Problem: D

## 2018-03-21 NOTE — PROGRESS NOTES
[facility]    Progress Note    Catiy Silva Patient Status:  Inpatient    1923 MRN [id number]   Location [facility] 5SW/SE Attending Janette Spear MD   Hosp Day # 7 PCP Violet Jara MD     Subjective:     Patient appears t 3/20/18 showed abscess along Gallbladder    Impression: probably severe cholecystitis, with local perforation --> IR placement tube in GB and abscess adjacent to GB 3/20/18    We will continue antibiotics and close observation.       Dilma Jeffries PA-C culture and bilirubin analysis. The drainage catheter is attached to a AMIE bulb.       Dictated by (CST): Ari Mon MD on 3/20/2018 at 16:59     Approved by (CST): Ari Mon MD on 3/20/2018 at 17:05          Mri Abdomen&mrcp W/3d (w+w0)(cpt=7

## 2018-03-22 ENCOUNTER — APPOINTMENT (OUTPATIENT)
Dept: CT IMAGING | Facility: HOSPITAL | Age: 83
DRG: 438 | End: 2018-03-22
Attending: HOSPITALIST
Payer: MEDICARE

## 2018-03-22 LAB
ALBUMIN SERPL BCP-MCNC: 2.3 G/DL (ref 3.5–4.8)
ALP SERPL-CCNC: 152 U/L (ref 32–100)
ALT SERPL-CCNC: 23 U/L (ref 14–54)
ANION GAP SERPL CALC-SCNC: 8 MMOL/L (ref 0–18)
AST SERPL-CCNC: 31 U/L (ref 15–41)
BASOPHILS # BLD: 0.1 K/UL (ref 0–0.2)
BASOPHILS NFR BLD: 1 %
BILIRUB DIRECT SERPL-MCNC: 0.2 MG/DL (ref 0–0.2)
BILIRUB SERPL-MCNC: 0.7 MG/DL (ref 0.3–1.2)
BUN SERPL-MCNC: 11 MG/DL (ref 8–20)
BUN/CREAT SERPL: 10.3 (ref 10–20)
CALCIUM SERPL-MCNC: 8.5 MG/DL (ref 8.5–10.5)
CHLORIDE SERPL-SCNC: 100 MMOL/L (ref 95–110)
CO2 SERPL-SCNC: 26 MMOL/L (ref 22–32)
CREAT SERPL-MCNC: 1.07 MG/DL (ref 0.5–1.5)
EOSINOPHIL # BLD: 0.1 K/UL (ref 0–0.7)
EOSINOPHIL NFR BLD: 1 %
ERYTHROCYTE [DISTWIDTH] IN BLOOD BY AUTOMATED COUNT: 13.1 % (ref 11–15)
GLUCOSE SERPL-MCNC: 118 MG/DL (ref 70–99)
HCT VFR BLD AUTO: 39.7 % (ref 35–48)
HGB BLD-MCNC: 13.6 G/DL (ref 12–16)
LYMPHOCYTES # BLD: 1 K/UL (ref 1–4)
LYMPHOCYTES NFR BLD: 7 %
MAGNESIUM SERPL-MCNC: 1.9 MG/DL (ref 1.8–2.5)
MCH RBC QN AUTO: 31.9 PG (ref 27–32)
MCHC RBC AUTO-ENTMCNC: 34.3 G/DL (ref 32–37)
MCV RBC AUTO: 93 FL (ref 80–100)
METAMYELOCYTES # BLD MANUAL: 0.13 K/UL
METAMYELOCYTES # BLD MANUAL: 0.13 K/UL
METAMYELOCYTES NFR BLD: 1 %
MONOCYTES # BLD: 0.4 K/UL (ref 0–1)
MONOCYTES NFR BLD: 3 %
MYELOCYTES NFR BLD: 1 %
NEUTROPHILS # BLD AUTO: 10.6 K/UL (ref 1.8–7.7)
NEUTROPHILS NFR BLD: 80 %
NEUTS BAND NFR BLD: 5 %
OSMOLALITY UR CALC.SUM OF ELEC: 278 MOSM/KG (ref 275–295)
PLATELET # BLD AUTO: 261 K/UL (ref 140–400)
PMV BLD AUTO: 8.9 FL (ref 7.4–10.3)
POTASSIUM SERPL-SCNC: 4.3 MMOL/L (ref 3.3–5.1)
PROT SERPL-MCNC: 5.8 G/DL (ref 5.9–8.4)
RBC # BLD AUTO: 4.27 M/UL (ref 3.7–5.4)
SODIUM SERPL-SCNC: 134 MMOL/L (ref 136–144)
VARIANT LYMPHS NFR BLD MANUAL: 1 %
WBC # BLD AUTO: 12.5 K/UL (ref 4–11)

## 2018-03-22 PROCEDURE — 80048 BASIC METABOLIC PNL TOTAL CA: CPT | Performed by: NURSE PRACTITIONER

## 2018-03-22 PROCEDURE — A4216 STERILE WATER/SALINE, 10 ML: HCPCS | Performed by: NURSE PRACTITIONER

## 2018-03-22 PROCEDURE — 83735 ASSAY OF MAGNESIUM: CPT | Performed by: HOSPITALIST

## 2018-03-22 PROCEDURE — 97116 GAIT TRAINING THERAPY: CPT

## 2018-03-22 PROCEDURE — 85007 BL SMEAR W/DIFF WBC COUNT: CPT | Performed by: NURSE PRACTITIONER

## 2018-03-22 PROCEDURE — 70450 CT HEAD/BRAIN W/O DYE: CPT | Performed by: HOSPITALIST

## 2018-03-22 PROCEDURE — 97530 THERAPEUTIC ACTIVITIES: CPT

## 2018-03-22 PROCEDURE — 85027 COMPLETE CBC AUTOMATED: CPT | Performed by: NURSE PRACTITIONER

## 2018-03-22 PROCEDURE — 85025 COMPLETE CBC W/AUTO DIFF WBC: CPT | Performed by: NURSE PRACTITIONER

## 2018-03-22 PROCEDURE — 80076 HEPATIC FUNCTION PANEL: CPT | Performed by: HOSPITALIST

## 2018-03-22 RX ORDER — HEPARIN SODIUM 5000 [USP'U]/ML
5000 INJECTION, SOLUTION INTRAVENOUS; SUBCUTANEOUS EVERY 12 HOURS SCHEDULED
Status: DISCONTINUED | OUTPATIENT
Start: 2018-03-22 | End: 2018-03-27

## 2018-03-22 NOTE — PHYSICAL THERAPY NOTE
PHYSICAL THERAPY TREATMENT NOTE - INPATIENT     Room Number: 537/537-A       Presenting Problem: pancreatis    Problem List  Principal Problem:    Acute pancreatitis  Active Problems:    Essential hypertension    Other specified hypothyroidism    Intracran Bearing Restriction: None                PAIN ASSESSMENT   Ratin  Location: abdomen  Management Techniques: Activity promotion; Body mechanics; Relaxation;Repositioning    BALANCE independent with walker - rolling      Goal #2  Current Status SBA/CGA with rolling walker    Goal #3 Patient is able to ambulate 100 feet with assist device: walker - rolling at assistance level: modified independent   Goal #3   Current Status 50ft with r

## 2018-03-22 NOTE — OCCUPATIONAL THERAPY NOTE
OCCUPATIONAL THERAPY TREATMENT NOTE - INPATIENT        Room Number: 537/537-A           Presenting Problem: acute pancreatitis    Problem List  Principal Problem:    Acute pancreatitis  Active Problems:    Essential hypertension    Other specified hypothyr mechanics;Repositioning     ACTIVITIES OF DAILY LIVING ASSESSMENT  AM-PAC ‘6-Clicks’ Inpatient Daily Activity Short Form  How much help from another person does the patient currently need…  -   Putting on and taking off regular lower body clothing?: A Bernardino

## 2018-03-22 NOTE — PLAN OF CARE
Problem: RESPIRATORY - ADULT  Goal: Achieves optimal ventilation and oxygenation  INTERVENTIONS:  - Assess for changes in respiratory status  - Assess for changes in mentation and behavior  - Position to facilitate oxygenation and minimize respiratory effo needed  - Optimize oral hygiene and moisture  - Encourage food from home; allow for food preferences  - Enhance eating environment   Outcome: Progressing      Problem: METABOLIC/FLUID AND ELECTROLYTES - ADULT  Goal: Electrolytes maintained within normal li and sodium.  Initiate appropriate interventions as ordered   Outcome: Progressing      Problem: Impaired Functional Mobility  Goal: Achieve highest/safest level of mobility/gait  Interventions:  - Assess patient's functional ability and stability  - Promote Goal  Patient's Short Term Goal: no pain    Interventions:   Pain medications  Non pharmacological management  - See additional Care Plan goals for specific interventions   Outcome: Progressing      Comments: Pt is alert and oriented x 3-4/forgetful, all d

## 2018-03-22 NOTE — PLAN OF CARE
Problem: RESPIRATORY - ADULT  Goal: Achieves optimal ventilation and oxygenation  INTERVENTIONS:  - Assess for changes in respiratory status  - Assess for changes in mentation and behavior  - Position to facilitate oxygenation and minimize respiratory effo Monitor for areas of redness and/or skin breakdown  - Initiate interventions, skin care algorithm/standards of care as needed   Outcome: Progressing      Problem: MUSCULOSKELETAL - ADULT  Goal: Return mobility to safest level of function  INTERVENTIONS:  - environment to reduce risk of injury  - Provide assistive devices as appropriate  - Consider OT/PT consult to assist with strengthening/mobility  - Encourage toileting schedule   Outcome: Progressing  No falls  Safety measures met  Frequent rounding   Neelam

## 2018-03-22 NOTE — PROGRESS NOTES
[address] [facility]    CDI Prediction Tool Protocol (Vancomycin Initiated)    OVP (oral vancomycin prophylaxis) 125 mg PO BID is being started in this patient based on a score of 13.       Score Breakdown:  High risk antibiotic use (5 points)  [facility]

## 2018-03-22 NOTE — PROGRESS NOTES
[facility] AND [facility]  Gastroenterology Progress Note    Mitchell Jones Patient Status:  Inpatient    1923 MRN [id number]   Location [facility] 5SW/SE Attending Cosmo Gillette MD   Hosp Day # 8 PCP Zoe Pitts MD     Subjective:  [address] Problem list:  Patient Active Problem List:     SNHL (sensorineural hearing loss)     Urge incontinence     Essential hypertension     Other specified hypothyroidism     Multiple lacunar infarcts (HCC)     Intracranial hemorrhage (HCC)     Stroke (HC

## 2018-03-22 NOTE — PROGRESS NOTES
[facility] Hospitalist Team  Progress Note    Rickey Lester Patient Status:  Inpatient    1923 MRN [id number]   Location [facility] 5SW/SE Attending Pascual Navas MD   Hosp Day # 8 PCP Kathie Souza MD     CC: Follow Up  PCP: Pedro Rodas so far negative however pt has been on zoysn since admission  -prn pain meds  -zosyn  -Soft diet/low fiber diet  -plan for continuing GB drain for minimum 6 weeks, drain in abscess will remain until output stops, then repeat CT at that time, if no further with area of abnormality on imaging  -keppra 250 BID  -pt no longer drives     HTN   - cont norvasc and metoprolol   - follow      Hypothyroidism  - cont home meds     GOC  -DNR, paper in chart  -POA daughter Tammy Other  -has 24 hour caregivers with [facility] Labs   Lab  03/20/18   0513  03/21/18   0541  03/22/18   0609   ALT  22  24  23   AST  27  29  31   ALB  2.3*  2.3*  2.3*       No results for input(s): PGLU in the last 72 hours. No results for input(s): TROP in the last 72 hours.         MEDICATIONS cm. This is suspicious for abscess. 2. Additional perihepatic ascites is demonstrated. 3. Minimal residual peripancreatic edema, suggesting resolving acute interstitial edematous pancreatitis. There is no evidence of peripancreatic fluid collection.   4. hemorrhagic stroke, does have <1% risk of pancreatitis   -MRCP-Adjacent to the gallbladder, there is a complex, loculated fluid collection measuring up to 5.3 cm, resolving interstitial edematous pancreatitis, no peripancreatic fluid collection, perihepati per cards note, pt to see neurology this week or next week.  No statin for LDL of 105 given possible amyloid angiopathy and increased risk of bleeding  -discussed with neuro here, thought DVT prophy at this point is ok, SQ heparin to be started if CT brain

## 2018-03-23 LAB
ALBUMIN SERPL BCP-MCNC: 2.4 G/DL (ref 3.5–4.8)
ALBUMIN/GLOB SERPL: 0.7 {RATIO} (ref 1–2)
ALP SERPL-CCNC: 149 U/L (ref 32–100)
ALT SERPL-CCNC: 32 U/L (ref 14–54)
ANION GAP SERPL CALC-SCNC: 7 MMOL/L (ref 0–18)
AST SERPL-CCNC: 45 U/L (ref 15–41)
BASOPHILS # BLD: 0.1 K/UL (ref 0–0.2)
BASOPHILS NFR BLD: 0 %
BILIRUB SERPL-MCNC: 0.8 MG/DL (ref 0.3–1.2)
BUN SERPL-MCNC: 15 MG/DL (ref 8–20)
BUN/CREAT SERPL: 12.4 (ref 10–20)
CALCIUM SERPL-MCNC: 8.6 MG/DL (ref 8.5–10.5)
CHLORIDE SERPL-SCNC: 100 MMOL/L (ref 95–110)
CO2 SERPL-SCNC: 29 MMOL/L (ref 22–32)
CREAT SERPL-MCNC: 1.21 MG/DL (ref 0.5–1.5)
EOSINOPHIL # BLD: 0.2 K/UL (ref 0–0.7)
EOSINOPHIL NFR BLD: 1 %
ERYTHROCYTE [DISTWIDTH] IN BLOOD BY AUTOMATED COUNT: 12.8 % (ref 11–15)
GLOBULIN PLAS-MCNC: 3.6 G/DL (ref 2.5–3.7)
GLUCOSE SERPL-MCNC: 109 MG/DL (ref 70–99)
HCT VFR BLD AUTO: 40.1 % (ref 35–48)
HGB BLD-MCNC: 13.7 G/DL (ref 12–16)
LYMPHOCYTES # BLD: 1.3 K/UL (ref 1–4)
LYMPHOCYTES NFR BLD: 9 %
MCH RBC QN AUTO: 32.1 PG (ref 27–32)
MCHC RBC AUTO-ENTMCNC: 34.2 G/DL (ref 32–37)
MCV RBC AUTO: 93.6 FL (ref 80–100)
MONOCYTES # BLD: 0.8 K/UL (ref 0–1)
MONOCYTES NFR BLD: 6 %
NEUTROPHILS # BLD AUTO: 11.5 K/UL (ref 1.8–7.7)
NEUTROPHILS NFR BLD: 83 %
OSMOLALITY UR CALC.SUM OF ELEC: 283 MOSM/KG (ref 275–295)
PLATELET # BLD AUTO: 301 K/UL (ref 140–400)
PMV BLD AUTO: 8.7 FL (ref 7.4–10.3)
POTASSIUM SERPL-SCNC: 3.9 MMOL/L (ref 3.3–5.1)
PROT SERPL-MCNC: 6 G/DL (ref 5.9–8.4)
RBC # BLD AUTO: 4.29 M/UL (ref 3.7–5.4)
SODIUM SERPL-SCNC: 136 MMOL/L (ref 136–144)
WBC # BLD AUTO: 13.8 K/UL (ref 4–11)

## 2018-03-23 PROCEDURE — A4216 STERILE WATER/SALINE, 10 ML: HCPCS | Performed by: NURSE PRACTITIONER

## 2018-03-23 PROCEDURE — 85025 COMPLETE CBC W/AUTO DIFF WBC: CPT | Performed by: NURSE PRACTITIONER

## 2018-03-23 PROCEDURE — 97530 THERAPEUTIC ACTIVITIES: CPT

## 2018-03-23 PROCEDURE — 97116 GAIT TRAINING THERAPY: CPT

## 2018-03-23 PROCEDURE — 80053 COMPREHEN METABOLIC PANEL: CPT | Performed by: NURSE PRACTITIONER

## 2018-03-23 NOTE — PROGRESS NOTES
[facility]acy Note:  Renal Adjustment for piperacillin/tazobactam (Luis Hebert)    Rickey Lester is a 80year old female who has been prescribed piperacillin/tazobactam (ZOSYN) 3.375 g every 8 hrs.   CrCl is < 20 [estimated creatinine clearance is 24.8 mL/min (base

## 2018-03-23 NOTE — CM/SW NOTE
Pt does have POLST form in EMR. SW attempted to meet w/ pt to confirm that POLST information is still correct - pt was asleep. SW to follow up when appropriate.     Apryl Ray, [address]

## 2018-03-23 NOTE — PLAN OF CARE
Problem: RESPIRATORY - ADULT  Goal: Achieves optimal ventilation and oxygenation  INTERVENTIONS:  - Assess for changes in respiratory status  - Assess for changes in mentation and behavior  - Position to facilitate oxygenation and minimize respiratory effo needed  - Optimize oral hygiene and moisture  - Encourage food from home; allow for food preferences  - Enhance eating environment   Outcome: Progressing      Problem: METABOLIC/FLUID AND ELECTROLYTES - ADULT  Goal: Electrolytes maintained within normal li and sodium.  Initiate appropriate interventions as ordered   Outcome: Progressing

## 2018-03-23 NOTE — PLAN OF CARE
Problem: RESPIRATORY - ADULT  Goal: Achieves optimal ventilation and oxygenation  INTERVENTIONS:  - Assess for changes in respiratory status  - Assess for changes in mentation and behavior  - Position to facilitate oxygenation and minimize respiratory effo needed  - Optimize oral hygiene and moisture  - Encourage food from home; allow for food preferences  - Enhance eating environment   Outcome: Progressing      Problem: METABOLIC/FLUID AND ELECTROLYTES - ADULT  Goal: Electrolytes maintained within normal li and sodium.  Initiate appropriate interventions as ordered   Outcome: Progressing      Problem: Impaired Functional Mobility  Goal: Achieve highest/safest level of mobility/gait  Interventions:  - Assess patient's functional ability and stability  - Promote Patient/Family Short Term Goal  Patient's Short Term Goal: no pain    Interventions:   Pain medications  Non pharmacological management  - See additional Care Plan goals for specific interventions   Outcome: Progressing      Comments: Pt is alert and orien

## 2018-03-23 NOTE — PROGRESS NOTES
[facility]  Gastroenterology Progress Note    Sariah Moran Patient Status:  Inpatient    1923 MRN [id number]   Location [facility] 5SW/SE Attending [address] Davies, [address] [facility] [facility] [address] Day # 9 PCP Maria E Wright MD     Subjective:  Royer Bright Multiple lacunar infarcts (HCC)     Intracranial hemorrhage (HCC)     Stroke (HCC)     Acute pancreatitis     Other acute pancreatitis, unspecified complication status     Closed compression fracture of thoracic vertebra, initial encounter ([facility] [address]     Leukoc

## 2018-03-23 NOTE — PLAN OF CARE
Problem: Patient/Family Goals  Goal: Patient/Family Short Term Goal  Patient's Short Term Goal: no pain    Interventions:   Pain medications  Non pharmacological management  - See additional Care Plan goals for specific interventions   Outcome: Progressing

## 2018-03-23 NOTE — PROGRESS NOTES
[facility] Hospitalist Progress Note     CC: Hospital Follow up    PCP: Manuel Nichols MD       Assessment/Plan:     Principal Problem:    Acute pancreatitis  Active Problems:    Essential hypertension    Other specified hypothyroidism    Intracranial hemorrh edematous pancreatitis, no peripancreatic fluid collection, perihepatic ascites  -3/22 S/P drains- one in GB, other in abscess next to GB.  Cultures pending-but so far negative however pt has been on zoysn since admission  -prn pain meds  -zosyn  -Soft diet DVT prophy at this point is ok, SQ heparin to be started if CT brain ok, CT brain pending  -sees Neurology at [facility]     Abnormal EEG  -EEG mild but congruent with area of abnormality on imaging  -keppra 250 BID  -pt no longer drives     HTN   - cont norvasc normal  SKIN: warm, dry  EXT: no edema    Data Review:       Labs:     Recent Labs   Lab  03/21/18   0849  03/22/18   0609  03/23/18   0543   RBC  4.21  4.27  4.29   HGB  13.4  13.6  13.7   HCT  39.5  39.7  40.1   MCV  93.7  93.0  93.6   MCH  31.7  31.9  3 (CST): Kameron Ocampo MD on 3/20/2018 at 16:59     Approved by (CST): Kameron Ocampo MD on 3/20/2018 at 17:05              Meds:     • vancomycin  125 mg Oral BID   • Heparin Sodium (Porcine)  5,000 Units Subcutaneous 2 times per day   • piperacillin

## 2018-03-23 NOTE — PHYSICAL THERAPY NOTE
PHYSICAL THERAPY TREATMENT NOTE - INPATIENT     Room Number: 537/537-A       Presenting Problem: pancreatis    Problem List  Principal Problem:    Acute pancreatitis  Active Problems:    Essential hypertension    Other specified hypothyroidism    Intracran INPATIENT SHORT FORM - BASIC MOBILITY  How much difficulty does the patient currently have. ..  -   Turning over in bed (including adjusting bedclothes, sheets and blankets)?: A Little   -   Sitting down on and standing up from a chair with arms (e.g., whee demonstrate independence with home activity/exercise instructions provided to patient in preparation for discharge.    Goal #5   Current Status In progress   Goal #6     Goal #6  Current Status

## 2018-03-23 NOTE — PROGRESS NOTES
[facility]    Progress Note    Faustino Kin Patient Status:  Inpatient    1923 MRN [id number]   Location [facility] 5SW/SE Attending Trupti Dennis MD   Hosp Day # 9 PCP Regis Goldstein MD     Subjective:     Patient appears t perf.    Family wantw to avoid surgery if at all possible. I explained we will tx medically for present time, it is poss pt may eventually need choleyctectomy if drainage and antibiotics alone fail. We will continue antibiotics and close observation. hemorrhage has resolved. Mild chronic residual laminar necrosis remains in this region consistent with chronic post infarct sequela.     Dictated by (CST): Tala Vick MD on 3/22/2018 at 14:58     Approved by (CST): Tala Vick MD on 3/22/20

## 2018-03-23 NOTE — PLAN OF CARE
Problem: Patient Centered Care  Goal: Patient preferences are identified and integrated in the patient's plan of care  Interventions:  - What would you like us to know as we care for you? I am hard of hearing and have hearing aids.  Keep my daughters inform

## 2018-03-23 NOTE — OCCUPATIONAL THERAPY NOTE
OCCUPATIONAL THERAPY TREATMENT NOTE - INPATIENT        Room Number: 537/537-A           Presenting Problem: acute pancreatitis    Problem List  Principal Problem:    Acute pancreatitis  Active Problems:    Essential hypertension    Other specified hypothyr from another person does the patient currently need…  -   Putting on and taking off regular lower body clothing?: A Little  -   Bathing (including washing, rinsing, drying)?: A Little  -   Toileting, which includes using toilet, bedpan or urinal? : A Littl

## 2018-03-24 LAB
ALBUMIN SERPL BCP-MCNC: 2.2 G/DL (ref 3.5–4.8)
ALBUMIN/GLOB SERPL: 0.6 {RATIO} (ref 1–2)
ALP SERPL-CCNC: 132 U/L (ref 32–100)
ALT SERPL-CCNC: 35 U/L (ref 14–54)
ANION GAP SERPL CALC-SCNC: 7 MMOL/L (ref 0–18)
AST SERPL-CCNC: 48 U/L (ref 15–41)
BILIRUB SERPL-MCNC: 0.6 MG/DL (ref 0.3–1.2)
BUN SERPL-MCNC: 15 MG/DL (ref 8–20)
BUN/CREAT SERPL: 13.3 (ref 10–20)
CALCIUM SERPL-MCNC: 8.3 MG/DL (ref 8.5–10.5)
CHLORIDE SERPL-SCNC: 101 MMOL/L (ref 95–110)
CO2 SERPL-SCNC: 27 MMOL/L (ref 22–32)
CREAT SERPL-MCNC: 1.13 MG/DL (ref 0.5–1.5)
ERYTHROCYTE [DISTWIDTH] IN BLOOD BY AUTOMATED COUNT: 12.8 % (ref 11–15)
GLOBULIN PLAS-MCNC: 3.4 G/DL (ref 2.5–3.7)
GLUCOSE SERPL-MCNC: 108 MG/DL (ref 70–99)
HCT VFR BLD AUTO: 37.6 % (ref 35–48)
HGB BLD-MCNC: 12.9 G/DL (ref 12–16)
MCH RBC QN AUTO: 31.9 PG (ref 27–32)
MCHC RBC AUTO-ENTMCNC: 34.3 G/DL (ref 32–37)
MCV RBC AUTO: 93.1 FL (ref 80–100)
OSMOLALITY UR CALC.SUM OF ELEC: 281 MOSM/KG (ref 275–295)
PLATELET # BLD AUTO: 288 K/UL (ref 140–400)
PMV BLD AUTO: 8.7 FL (ref 7.4–10.3)
POTASSIUM SERPL-SCNC: 3.9 MMOL/L (ref 3.3–5.1)
PROT SERPL-MCNC: 5.6 G/DL (ref 5.9–8.4)
RBC # BLD AUTO: 4.04 M/UL (ref 3.7–5.4)
SODIUM SERPL-SCNC: 135 MMOL/L (ref 136–144)
WBC # BLD AUTO: 12.5 K/UL (ref 4–11)

## 2018-03-24 PROCEDURE — 85027 COMPLETE CBC AUTOMATED: CPT | Performed by: HOSPITALIST

## 2018-03-24 PROCEDURE — A4216 STERILE WATER/SALINE, 10 ML: HCPCS | Performed by: NURSE PRACTITIONER

## 2018-03-24 PROCEDURE — 80053 COMPREHEN METABOLIC PANEL: CPT | Performed by: HOSPITALIST

## 2018-03-24 NOTE — PLAN OF CARE
Problem: SAFETY ADULT - FALL  Goal: Free from fall injury  INTERVENTIONS:  - Assess pt frequently for physical needs  - Identify cognitive and physical deficits and behaviors that affect risk of falls.   - [place] fall precautions as indicated by assessme

## 2018-03-24 NOTE — PROGRESS NOTES
[facility]    Progress Note    Lou Pfeiffer Patient Status:  Inpatient    1923 MRN [id number]   Location [facility] 5SW/SE Attending Mary Anne Shankar MD   Hosp Day # 10 PCP Bhavya Hartley MD     Subjective:     Patient appears cholecystitis, with local perf. Family wantw to avoid surgery if at all possible. I explained we will tx medically for present time, it is poss pt may eventually need choleyctectomy if drainage and antibiotics alone fail.   homefully can go home on or intracranial abnormality. 4. Previous small left frontal subcortical hemorrhage has resolved. Mild chronic residual laminar necrosis remains in this region consistent with chronic post infarct sequela.     Dictated by (CST): Alvaro Aleman MD on 3/22/2

## 2018-03-24 NOTE — PROGRESS NOTES
[facility] Hospitalist Progress Note     CC: Hospital Follow up    PCP: Susu Robertson MD       Assessment/Plan:     Principal Problem:    Acute pancreatitis  Active Problems:    Essential hypertension    Other specified hypothyroidism    Intracranial hemorrh edematous pancreatitis, no peripancreatic fluid collection, perihepatic ascites  -3/22 S/P drains- one in GB, other in abscess next to GB.  Cultures pending-but so far negative however pt has been on zoysn since admission  -prn pain meds  -zosyn  -Soft diet DVT prophy at this point is ok, SQ heparin to be started if CT brain ok, CT brain pending  -sees Neurology at [facility]     Abnormal EEG  -EEG mild but congruent with area of abnormality on imaging  -keppra 250 BID  -pt no longer drives     HTN   - cont norvasc edema    Data Review:       Labs:     Recent Labs   Lab  03/22/18   0609  03/23/18   0543  03/24/18   0537   RBC  4.27  4.29  4.04   HGB  13.6  13.7  12.9   HCT  39.7  40.1  37.6   MCV  93.0  93.6  93.1   MCH  31.9  32.1*  31.9   MCHC  34.3  34.2  34.3   R

## 2018-03-25 LAB
ALBUMIN SERPL BCP-MCNC: 2.3 G/DL (ref 3.5–4.8)
ALBUMIN/GLOB SERPL: 0.6 {RATIO} (ref 1–2)
ALP SERPL-CCNC: 128 U/L (ref 32–100)
ALT SERPL-CCNC: 37 U/L (ref 14–54)
ANION GAP SERPL CALC-SCNC: 9 MMOL/L (ref 0–18)
AST SERPL-CCNC: 46 U/L (ref 15–41)
BASOPHILS # BLD: 0.1 K/UL (ref 0–0.2)
BASOPHILS NFR BLD: 1 %
BILIRUB SERPL-MCNC: 0.6 MG/DL (ref 0.3–1.2)
BUN SERPL-MCNC: 14 MG/DL (ref 8–20)
BUN/CREAT SERPL: 11.6 (ref 10–20)
CALCIUM SERPL-MCNC: 8.3 MG/DL (ref 8.5–10.5)
CHLORIDE SERPL-SCNC: 98 MMOL/L (ref 95–110)
CO2 SERPL-SCNC: 27 MMOL/L (ref 22–32)
CREAT SERPL-MCNC: 1.21 MG/DL (ref 0.5–1.5)
EOSINOPHIL # BLD: 0.2 K/UL (ref 0–0.7)
EOSINOPHIL NFR BLD: 2 %
ERYTHROCYTE [DISTWIDTH] IN BLOOD BY AUTOMATED COUNT: 12.9 % (ref 11–15)
GLOBULIN PLAS-MCNC: 3.6 G/DL (ref 2.5–3.7)
GLUCOSE SERPL-MCNC: 105 MG/DL (ref 70–99)
HCT VFR BLD AUTO: 39.3 % (ref 35–48)
HGB BLD-MCNC: 13.5 G/DL (ref 12–16)
LYMPHOCYTES # BLD: 1.4 K/UL (ref 1–4)
LYMPHOCYTES NFR BLD: 12 %
MAGNESIUM SERPL-MCNC: 2.2 MG/DL (ref 1.8–2.5)
MCH RBC QN AUTO: 32.2 PG (ref 27–32)
MCHC RBC AUTO-ENTMCNC: 34.3 G/DL (ref 32–37)
MCV RBC AUTO: 93.7 FL (ref 80–100)
MONOCYTES # BLD: 0.7 K/UL (ref 0–1)
MONOCYTES NFR BLD: 6 %
NEUTROPHILS # BLD AUTO: 9.2 K/UL (ref 1.8–7.7)
NEUTROPHILS NFR BLD: 80 %
OSMOLALITY UR CALC.SUM OF ELEC: 279 MOSM/KG (ref 275–295)
PLATELET # BLD AUTO: 304 K/UL (ref 140–400)
PMV BLD AUTO: 8.7 FL (ref 7.4–10.3)
POTASSIUM SERPL-SCNC: 4 MMOL/L (ref 3.3–5.1)
PROT SERPL-MCNC: 5.9 G/DL (ref 5.9–8.4)
RBC # BLD AUTO: 4.19 M/UL (ref 3.7–5.4)
SODIUM SERPL-SCNC: 134 MMOL/L (ref 136–144)
WBC # BLD AUTO: 11.5 K/UL (ref 4–11)

## 2018-03-25 PROCEDURE — 83735 ASSAY OF MAGNESIUM: CPT | Performed by: HOSPITALIST

## 2018-03-25 PROCEDURE — A4216 STERILE WATER/SALINE, 10 ML: HCPCS | Performed by: NURSE PRACTITIONER

## 2018-03-25 PROCEDURE — 80053 COMPREHEN METABOLIC PANEL: CPT | Performed by: HOSPITALIST

## 2018-03-25 PROCEDURE — 85025 COMPLETE CBC W/AUTO DIFF WBC: CPT | Performed by: HOSPITALIST

## 2018-03-25 NOTE — PLAN OF CARE
Problem: RESPIRATORY - ADULT  Goal: Achieves optimal ventilation and oxygenation  INTERVENTIONS:  - Assess for changes in respiratory status  - Assess for changes in mentation and behavior  - Position to facilitate oxygenation and minimize respiratory effo needed  - Optimize oral hygiene and moisture  - Encourage food from home; allow for food preferences  - Enhance eating environment   Outcome: Progressing      Problem: METABOLIC/FLUID AND ELECTROLYTES - ADULT  Goal: Electrolytes maintained within normal li and sodium.  Initiate appropriate interventions as ordered   Outcome: Progressing      Problem: Impaired Functional Mobility  Goal: Achieve highest/safest level of mobility/gait  Interventions:  - Assess patient's functional ability and stability  - Promote interventions    Outcome: Progressing    Goal: Patient/Family Short Term Goal  Patient's Short Term Goal: no pain    Interventions:   Pain medications  Non pharmacological management  - See additional Care Plan goals for specific interventions   Outcome: P

## 2018-03-25 NOTE — PLAN OF CARE
Problem: RESPIRATORY - ADULT  Goal: Achieves optimal ventilation and oxygenation  INTERVENTIONS:  - Assess for changes in respiratory status  - Assess for changes in mentation and behavior  - Position to facilitate oxygenation and minimize respiratory effo needed  - Optimize oral hygiene and moisture  - Encourage food from home; allow for food preferences  - Enhance eating environment   Outcome: Progressing      Problem: METABOLIC/FLUID AND ELECTROLYTES - ADULT  Goal: Electrolytes maintained within normal li and sodium.  Initiate appropriate interventions as ordered   Outcome: Progressing      Problem: Impaired Functional Mobility  Goal: Achieve highest/safest level of mobility/gait  Interventions:  - Assess patient's functional ability and stability  - Promote Patient/Family Short Term Goal  Patient's Short Term Goal: no pain    Interventions:   Pain medications  Non pharmacological management  - See additional Care Plan goals for specific interventions   Outcome: Progressing

## 2018-03-25 NOTE — PROGRESS NOTES
5 day history of respiratory illness. Diagnosed with RSV 2 days ago. Today working hard to breath. Tachypnic and retracting sub sternal. Decreased feeding and wet diapers.   [facility] Hospitalist Progress Note     CC: Hospital Follow up    PCP: Belinda Amaro MD       Assessment/Plan:     Principal Problem:    Acute pancreatitis  Active Problems:    Essential hypertension    Other specified hypothyroidism    Intracranial hemorrh edematous pancreatitis, no peripancreatic fluid collection, perihepatic ascites  -3/22 S/P drains- one in GB, other in abscess next to GB.  Cultures pending-but so far negative however pt has been on zoysn since admission  -prn pain meds  -zosyn  -Soft diet neuro here, thought DVT prophy at this point is ok, SQ heparin to be started if CT brain ok, CT brain pending  -sees Neurology at [facility]     Abnormal EEG  -EEG mild but congruent with area of abnormality on imaging  -keppra 250 BID  -pt no longer drives     H normal  SKIN: warm, dry  EXT: no edema    Data Review:       Labs:     Recent Labs   Lab  03/23/18   0543  03/24/18   0537  03/25/18   0621   RBC  4.29  4.04  4.19   HGB  13.7  12.9  13.5   HCT  40.1  37.6  39.3   MCV  93.6  93.1  93.7   MCH  32.1*  31.9

## 2018-03-25 NOTE — PROGRESS NOTES
[facility]    Progress Note    Ramana Garcia Patient Status:  Inpatient    1923 MRN [id number]   Location [facility] 5SW/SE Attending Xochilt Way MD   Hosp Day # 11 PCP Juan Goodrich MD     Subjective:     Patient appears drained  Impression. prob severe cholecystitis, with local perf. Family wantw to avoid surgery if at all possible.     I explained we will tx medically for present time, it is poss pt may eventually need choleyctectomy if drainage and antibiotics alone

## 2018-03-25 NOTE — PLAN OF CARE
Problem: RESPIRATORY - ADULT  Goal: Achieves optimal ventilation and oxygenation  INTERVENTIONS:  - Assess for changes in respiratory status  - Assess for changes in mentation and behavior  - Position to facilitate oxygenation and minimize respiratory effo needed  - Optimize oral hygiene and moisture  - Encourage food from home; allow for food preferences  - Enhance eating environment   Outcome: Progressing      Problem: METABOLIC/FLUID AND ELECTROLYTES - ADULT  Goal: Electrolytes maintained within normal li and sodium.  Initiate appropriate interventions as ordered   Outcome: Progressing      Problem: Impaired Functional Mobility  Goal: Achieve highest/safest level of mobility/gait  Interventions:  - Assess patient's functional ability and stability  - Promote interventions    Outcome: Progressing      Comments: [place] still draining from AMIE drains. Up in chair. Assist with walker to bathroom.

## 2018-03-26 ENCOUNTER — APPOINTMENT (OUTPATIENT)
Dept: CT IMAGING | Facility: HOSPITAL | Age: 83
DRG: 438 | End: 2018-03-26
Attending: CLINICAL NURSE SPECIALIST
Payer: MEDICARE

## 2018-03-26 LAB
ALBUMIN SERPL BCP-MCNC: 2.3 G/DL (ref 3.5–4.8)
ALBUMIN/GLOB SERPL: 0.7 {RATIO} (ref 1–2)
ALP SERPL-CCNC: 111 U/L (ref 32–100)
ALT SERPL-CCNC: 31 U/L (ref 14–54)
ANION GAP SERPL CALC-SCNC: 9 MMOL/L (ref 0–18)
AST SERPL-CCNC: 34 U/L (ref 15–41)
BASOPHILS # BLD: 0.1 K/UL (ref 0–0.2)
BASOPHILS NFR BLD: 1 %
BILIRUB SERPL-MCNC: 0.6 MG/DL (ref 0.3–1.2)
BUN SERPL-MCNC: 19 MG/DL (ref 8–20)
BUN/CREAT SERPL: 16 (ref 10–20)
CALCIUM SERPL-MCNC: 8.1 MG/DL (ref 8.5–10.5)
CHLORIDE SERPL-SCNC: 100 MMOL/L (ref 95–110)
CO2 SERPL-SCNC: 26 MMOL/L (ref 22–32)
CREAT SERPL-MCNC: 1.19 MG/DL (ref 0.5–1.5)
EOSINOPHIL # BLD: 0.2 K/UL (ref 0–0.7)
EOSINOPHIL NFR BLD: 2 %
ERYTHROCYTE [DISTWIDTH] IN BLOOD BY AUTOMATED COUNT: 13.1 % (ref 11–15)
GLOBULIN PLAS-MCNC: 3.4 G/DL (ref 2.5–3.7)
GLUCOSE SERPL-MCNC: 106 MG/DL (ref 70–99)
HCT VFR BLD AUTO: 36.3 % (ref 35–48)
HGB BLD-MCNC: 12.3 G/DL (ref 12–16)
LYMPHOCYTES # BLD: 1.3 K/UL (ref 1–4)
LYMPHOCYTES NFR BLD: 11 %
MAGNESIUM SERPL-MCNC: 2.2 MG/DL (ref 1.8–2.5)
MCH RBC QN AUTO: 31.8 PG (ref 27–32)
MCHC RBC AUTO-ENTMCNC: 33.9 G/DL (ref 32–37)
MCV RBC AUTO: 93.7 FL (ref 80–100)
MONOCYTES # BLD: 0.7 K/UL (ref 0–1)
MONOCYTES NFR BLD: 6 %
NEUTROPHILS # BLD AUTO: 9.8 K/UL (ref 1.8–7.7)
NEUTROPHILS NFR BLD: 81 %
OSMOLALITY UR CALC.SUM OF ELEC: 283 MOSM/KG (ref 275–295)
PLATELET # BLD AUTO: 282 K/UL (ref 140–400)
PMV BLD AUTO: 8.2 FL (ref 7.4–10.3)
POTASSIUM SERPL-SCNC: 3.7 MMOL/L (ref 3.3–5.1)
PROT SERPL-MCNC: 5.7 G/DL (ref 5.9–8.4)
RBC # BLD AUTO: 3.88 M/UL (ref 3.7–5.4)
SODIUM SERPL-SCNC: 135 MMOL/L (ref 136–144)
WBC # BLD AUTO: 12.1 K/UL (ref 4–11)

## 2018-03-26 PROCEDURE — A4216 STERILE WATER/SALINE, 10 ML: HCPCS | Performed by: NURSE PRACTITIONER

## 2018-03-26 PROCEDURE — 80053 COMPREHEN METABOLIC PANEL: CPT | Performed by: HOSPITALIST

## 2018-03-26 PROCEDURE — 83735 ASSAY OF MAGNESIUM: CPT | Performed by: HOSPITALIST

## 2018-03-26 PROCEDURE — 85025 COMPLETE CBC W/AUTO DIFF WBC: CPT | Performed by: HOSPITALIST

## 2018-03-26 PROCEDURE — 74177 CT ABD & PELVIS W/CONTRAST: CPT | Performed by: CLINICAL NURSE SPECIALIST

## 2018-03-26 PROCEDURE — 97530 THERAPEUTIC ACTIVITIES: CPT

## 2018-03-26 PROCEDURE — 97116 GAIT TRAINING THERAPY: CPT

## 2018-03-26 RX ORDER — POTASSIUM CHLORIDE 20 MEQ/1
40 TABLET, EXTENDED RELEASE ORAL ONCE
Status: DISCONTINUED | OUTPATIENT
Start: 2018-03-26 | End: 2018-03-27

## 2018-03-26 NOTE — OCCUPATIONAL THERAPY NOTE
Chart reviewed. RN cleared patient for therapy; patient received in elevated supine position. Patient states she had a CT test and is very tired. Patient declined therapy despite education on benefits of activity promotion and functional position changes.

## 2018-03-26 NOTE — PHYSICAL THERAPY NOTE
PHYSICAL THERAPY TREATMENT NOTE - INPATIENT     Room Number: 537/537-A       Presenting Problem: pancreatis    Problem List  Principal Problem:    Acute pancreatitis  Active Problems:    Essential hypertension    Other specified hypothyroidism    Intracran kitchen and family room on main level. Pt not appropriate for stairs at this time. Stairs were assessed on 3/22/18 pt required 2 A per chart. Discussed with grand dtr at this time pt with need for 2 person assist to negotiate stairs.      Grand dtr promotion;Repositioning    BALANCE                                                                                                                     Static Sitting: Good  Dynamic Sitting: Fair +           Static Standing: Fair -  Dynamic Standing: Poor + level: independent      Goal #1   Current Status Min assist needed pt fatigued this visit required encouragment    Goal #2 Patient is able to demonstrate transfers Sit to/from [place] assistance level: modified independent with walker - rolling      Goal

## 2018-03-26 NOTE — PROGRESS NOTES
[facility] Hospitalist Team  Progress Note    Hadley Skyasmin Patient Status:  Inpatient    1923 MRN [id number]   Location [facility] 5SW/SE Attending Waldo Jacinto MD   Hosp Day # 12 PCP Cholo Ross MD     CC: Follow Up  PCP: Jannet Boo.  J 3/23- Dr Trinh Mins  -as per Surgery/Radiology     Pleural Effusions/compressive atelectasis  -noted + I/O from IVF, lasix 20 mg IV 3/20, no resp distress on RA  -aggressive SANDY     VERNON on CKD Stage 3-Resolved  -highest outpt Cr 1.1  -Cr increased to 1.2 om 3/2 daughter Tammy Other  -has 24 hour caregivers with [facility]     FEN  -lytes in am  -diet-low fiber/soft diet     Prophy  -SCD  -SQ heparin      Dispo  -Final D/C plan pending todays CT A/P  Daughter Brina Amaya and son  at [facility] and given update daily at bedside  PCP: Susan Mcbride ADD-vantage 3.375 g Intravenous Q12H   vancomycin (FIRST-VANCOMYCIN 50) 50 MG/ML oral solution 125 mg 125 mg Oral BID   Heparin Sodium (Porcine) 5000 UNIT/ML injection 5,000 Units 5,000 Units Subcutaneous 2 times per day   Calcium Carbonate Antacid (TUMS) abscess adjacent to GB, S/P drain placement by IR in GB and abscess, -plan for continuing GB drain for minimum 6 weeks, drain in abscess will remain until output stops, then repeat CT at that time, if no further fluid then likely pull drain and DC with Amalia Hardy now increased to 1.2 om 3/21 with dose IV lasix on 3/20, will hold on further doses. Cr now 1/07  -follow Cr     Diarrhea  - has been having loose stools prior to hospitalization, had 2 loose BM today  - C.  Diff negative     Delirium-resolved   -2/2 infect

## 2018-03-26 NOTE — DIETARY NOTE
ADULT NUTRITION INITIAL ASSESSMENT    Pt is at moderate nutrition risk. Pt does not meet malnutrition criteria.       RECOMMENDATIONS TO MD:  See Nutrition Intervention     NUTRITION DIAGNOSIS/PROBLEM:  Inadequate oral intake related to decreased ability t kg/m2 - overweight  IBW: 85 lbs        144% IBW  Usual Body Wt: 122 lbs       100% UBW    WEIGHT HISTORY: No reported wt changes. Wt is up from 1 year ago.   Patient Weight(s) for the past 336 hrs:   Weight   03/22/18 0409 55.3 kg (122 lb)   03/20/18 1144 5 vanilla with breakfast  ESTIMATED NUTRITION NEEDS:  Calories: 8667-3912 calories/day (20-25 calories per kg Current wt)  Protein: 55-66 grams protein/day (1-1.2 grams protein per kg Current wt)    MONITOR AND EVALUATE/NUTRITION GOALS:  - Food and Nutrient

## 2018-03-26 NOTE — PLAN OF CARE
Problem: RESPIRATORY - ADULT  Goal: Achieves optimal ventilation and oxygenation  INTERVENTIONS:  - Assess for changes in respiratory status  - Assess for changes in mentation and behavior  - Position to facilitate oxygenation and minimize respiratory effo consult as needed  - Optimize oral hygiene and moisture  - Encourage food from home; allow for food preferences  - Enhance eating environment   Outcome: Progressing      Problem: METABOLIC/FLUID AND ELECTROLYTES - ADULT  Goal: Electrolytes maintained withi Monitor temperature, glucose, and sodium.  Initiate appropriate interventions as ordered   Outcome: Progressing      Problem: Impaired Functional Mobility  Goal: Achieve highest/safest level of mobility/gait  Interventions:  - Assess patient's functional ab

## 2018-03-26 NOTE — PAYOR COMM NOTE
Kristen Alejandro #[id number]  (80 year old F)     [facility] [id number]   Magnolia Jones MD Physician Addendum Hospitalist  Progress Notes Date of Service: 3/26/2018  9:55 AM         []Manual[]Template  []Copied  [facility] Hospitalist Team  Progress Note    -3/22 S/P drains- one in GB, other in abscess next to GB.  Cultures negative however pt has been on zoysn since admission  -zosyn  -Soft diet/low fiber diet  -CT A/P today, final plan regarding abx (daughter prefers oral at D/C), drains etc based on CT find -plans to follow up with Neurology at [facility]     Abnormal EEG  -EEG mild but congruent with area of abnormality on imaging  -keppra 250 BID  -pt no longer drives     HTN   - cont norvasc and metoprolol   - follow      Hypothyroidism  - cont home meds     GOC No results for input(s): PGLU in the last 72 hours.     No results for input(s): TROP in the last 72 hours.           MEDICATIONS         Current Facility-Administered Medications:  Potassium Chloride ER (K-DUR M20) CR tab 40 mEq 40 mEq Oral Once   [place] Ms. Zoe Montero is a 79 y/o F w/ HTN, [place], ?afib, HL, previous TIA with recent admission to [place] on 1/24/18 with acute CVA with mild intracranial hemorrhage with foci of prior hemorrhage, AC held, keppra started due abnl EEG who now presents with abdominal pa -plan for continuing GB drain for minimum 6 weeks, drain in abscess will remain until output stops, then repeat CT at that time, if no further fluid then likely pull drain and DC with Oral antibiotics, but if fluid persists may need further drain placement -keppra 250 BID  -pt no longer drives     HTN   - cont norvasc and metoprolol   - follow      Hypothyroidism  - cont home meds     GOC  -DNR, paper in chart  -POA daughter Cruz Amezquita  -has 24 hour caregivers with [address] as above.  Elliott Russell MD

## 2018-03-26 NOTE — PROGRESS NOTES
[facility]    Progress Note    Diana Smith Patient Status:  Inpatient    1923 MRN [id number]   Location [facility] 5SW/SE Attending Rodrigue Edwards MD   Hosp Day # 12 PCP Wanetta Lefort, MD     Subjective:     Patient appears continue antibiotics and close observation.           Kandy Yadav MD [facility]  General Surgery  [facility]  3/26/2018  6:53 PM                '    Meds:     • Potassium Chloride ER  40 mEq Oral Once   • piperacillin-tazobactam  3.375 g Intravenous Q1 findings may be reactive in nature or reflect cholangitis. 2. Findings suggesting resolving acute interstitial edematous pancreatitis involving the pancreatic head.  There is a new 18 x 14 mm simple-appearing fluid collection along the ventral/inferior manish

## 2018-03-26 NOTE — PROGRESS NOTES
[facility]  Progress Note    Diana Smith Patient Status:  Inpatient    1923 MRN [id number]   Location [facility] 5SW/SE Attending Catrina Rivas MD   Hosp Day # 12 PCP Wanetta Lefort, MD       Subjective:   Denies dis most likely reflecting a developing pseudocyst. 3. Small volume free fluid in the pelvis, new since prior. 4. Colonic diverticulosis. 5. Large hiatal hernia. 6. Extensive coronary and peripheral atherosclerosis.  7. Peripherally calcified subcentimeter dist

## 2018-03-27 VITALS
HEART RATE: 67 BPM | WEIGHT: 122 LBS | HEIGHT: 57 IN | BODY MASS INDEX: 26.32 KG/M2 | TEMPERATURE: 97 F | RESPIRATION RATE: 18 BRPM | SYSTOLIC BLOOD PRESSURE: 118 MMHG | DIASTOLIC BLOOD PRESSURE: 59 MMHG | OXYGEN SATURATION: 92 %

## 2018-03-27 PROBLEM — K85.80 OTHER ACUTE PANCREATITIS, UNSPECIFIED COMPLICATION STATUS: Status: RESOLVED | Noted: 2018-03-14 | Resolved: 2018-03-27

## 2018-03-27 LAB
ALBUMIN SERPL BCP-MCNC: 2.3 G/DL (ref 3.5–4.8)
ALBUMIN/GLOB SERPL: 0.7 {RATIO} (ref 1–2)
ALP SERPL-CCNC: 110 U/L (ref 32–100)
ALT SERPL-CCNC: 27 U/L (ref 14–54)
ANION GAP SERPL CALC-SCNC: 6 MMOL/L (ref 0–18)
AST SERPL-CCNC: 32 U/L (ref 15–41)
BASOPHILS # BLD: 0.1 K/UL (ref 0–0.2)
BASOPHILS NFR BLD: 1 %
BILIRUB SERPL-MCNC: 0.6 MG/DL (ref 0.3–1.2)
BUN SERPL-MCNC: 16 MG/DL (ref 8–20)
BUN/CREAT SERPL: 15.1 (ref 10–20)
CALCIUM SERPL-MCNC: 8.1 MG/DL (ref 8.5–10.5)
CHLORIDE SERPL-SCNC: 100 MMOL/L (ref 95–110)
CO2 SERPL-SCNC: 28 MMOL/L (ref 22–32)
CREAT SERPL-MCNC: 1.06 MG/DL (ref 0.5–1.5)
EOSINOPHIL # BLD: 0.2 K/UL (ref 0–0.7)
EOSINOPHIL NFR BLD: 2 %
ERYTHROCYTE [DISTWIDTH] IN BLOOD BY AUTOMATED COUNT: 13.2 % (ref 11–15)
GLOBULIN PLAS-MCNC: 3.4 G/DL (ref 2.5–3.7)
GLUCOSE SERPL-MCNC: 115 MG/DL (ref 70–99)
HCT VFR BLD AUTO: 36.5 % (ref 35–48)
HGB BLD-MCNC: 12.3 G/DL (ref 12–16)
LYMPHOCYTES # BLD: 1.1 K/UL (ref 1–4)
LYMPHOCYTES NFR BLD: 10 %
MAGNESIUM SERPL-MCNC: 2.1 MG/DL (ref 1.8–2.5)
MCH RBC QN AUTO: 31.3 PG (ref 27–32)
MCHC RBC AUTO-ENTMCNC: 33.6 G/DL (ref 32–37)
MCV RBC AUTO: 93.1 FL (ref 80–100)
MONOCYTES # BLD: 0.7 K/UL (ref 0–1)
MONOCYTES NFR BLD: 6 %
NEUTROPHILS # BLD AUTO: 9.3 K/UL (ref 1.8–7.7)
NEUTROPHILS NFR BLD: 82 %
OSMOLALITY UR CALC.SUM OF ELEC: 280 MOSM/KG (ref 275–295)
PLATELET # BLD AUTO: 299 K/UL (ref 140–400)
PMV BLD AUTO: 8.2 FL (ref 7.4–10.3)
POTASSIUM SERPL-SCNC: 3.8 MMOL/L (ref 3.3–5.1)
PROT SERPL-MCNC: 5.7 G/DL (ref 5.9–8.4)
RBC # BLD AUTO: 3.92 M/UL (ref 3.7–5.4)
SODIUM SERPL-SCNC: 134 MMOL/L (ref 136–144)
WBC # BLD AUTO: 11.4 K/UL (ref 4–11)

## 2018-03-27 PROCEDURE — 83735 ASSAY OF MAGNESIUM: CPT | Performed by: HOSPITALIST

## 2018-03-27 PROCEDURE — 97530 THERAPEUTIC ACTIVITIES: CPT

## 2018-03-27 PROCEDURE — 80053 COMPREHEN METABOLIC PANEL: CPT | Performed by: NURSE PRACTITIONER

## 2018-03-27 PROCEDURE — 85025 COMPLETE CBC W/AUTO DIFF WBC: CPT | Performed by: NURSE PRACTITIONER

## 2018-03-27 PROCEDURE — 97116 GAIT TRAINING THERAPY: CPT

## 2018-03-27 RX ORDER — POTASSIUM CHLORIDE 20 MEQ/1
40 TABLET, EXTENDED RELEASE ORAL ONCE
Status: COMPLETED | OUTPATIENT
Start: 2018-03-27 | End: 2018-03-27

## 2018-03-27 RX ORDER — AMOXICILLIN AND CLAVULANATE POTASSIUM 500; 125 MG/1; MG/1
1 TABLET, FILM COATED ORAL 2 TIMES DAILY
Qty: 28 TABLET | Refills: 0 | Status: SHIPPED | OUTPATIENT
Start: 2018-03-27 | End: 2018-04-10

## 2018-03-27 RX ORDER — ACETAMINOPHEN 325 MG/1
650 TABLET ORAL EVERY 6 HOURS PRN
Qty: 30 TABLET | Refills: 0 | Status: SHIPPED | OUTPATIENT
Start: 2018-03-27

## 2018-03-27 NOTE — DISCHARGE SUMMARY
[facility] Hospitalist Discharge Summary   Patient ID:  Margret Mckdaisy  [id number]  5 year old  9/16/1923    Admit date: 3/14/2018  Discharge date: 3/27/2018    Primary Care Physician: Belinda Amaro MD   Attending Physician: Magnolia Jones MD   Consults: released from PT yesterday, she is DNR.  She was scheduled to see neurology this week or next week      Hospital Course:     Ms. Garrison Mccann is a 81 y/o F w/ HTN, [place], ?afib, HL, previous TIA with recent admission to [place] on 1/24/18 with acute CVA with mild in mg BID x 2 weeks (adjusted for Cr Cl)  -CBC in 2 weeks  -follow up IR Dr Blanca Ponce with CT A/P on 4/17  -follow up with Dr Nikhil Haji 2 weeks     Pleural Effusions/Compressive atelectasis  -noted + I/O from IVF, lasix 20 mg IV 3/20, no resp distress on RA  -aggr meds     GOC  -DNR  -POA daughter Chelsea Rhoades  -has 24 hour caregivers with [facility]       EXAM:   GENERAL: no apparent distress, overweight  NEURO: A/A Ox3  RESP: non labored, CTA  CARDIO: Regular, no murmur  ABD: soft, Drain x 1  EXTREMITIES: no edema, no calf tend these medications    acetaminophen 325 MG Tabs  Commonly known as:  TYLENOL  Take 2 tablets (650 mg total) by mouth every 6 (six) hours as needed.         CONTINUE taking these medications    AmLODIPine Besylate 5 MG Tabs  Commonly known as:  NORVASC  Take [phone]          Maps & Directions          [address]  Follow up with IR April 17 th, 2018 at 11:00 am in the Cath Lab, check in time is at 10:00 am. Patient To Park in blue  lot and to enter the [facility] an Resp 18   Ht 144.8 cm (4' 9\")   Wt 122 lb (55.3 kg)   SpO2 92%   BMI 26.40 kg/m²     Exam   GEN: elderly female in NAD  HEENT: EOMI, PERRLA  Neck: Supple, no JVD  Pulm: CTAB, no crackles or wheezes  CV: RRR, no murmurs   ABD: Soft, nontender, non-distende 06     Diarrhea-Resolved  - has been having loose stools prior to hospitalization  - C.  Diff negative     Delirium-resolved   -2/2 infection   -follow     Hypokalemia  -repleted via protocol     Elevated BS  -A1C 5.8     Microscopic Hematuria  -RBC 11 on U

## 2018-03-27 NOTE — PROGRESS NOTES
[facility]    Progress Note    Brandon Santos Patient Status:  Inpatient    1923 MRN [id number]   Location [facility] 5SW/SE Attending Uzma Ca MD   Hosp Day # 15 PCP Jordon Phillips MD     Subjective:     Patient appears abx  We will continue antibiotics and close observation. Plan - home today on oral augmentin x 2 weeks, follow up with Dr. Keshav Recinos in clinic in 2 weeks. Follow up CT a/p and follow up with IR on April 17th.  Continue drain care at home, bring in log of d small amount of residual multiloculated fluid at the level of the bhupinder hepatis persists (largest locule measuring 19 x 15 mm) with nonspecific periportal edema as well as mural enhancement involving the common bile duct.  These findings may be reactive in

## 2018-03-27 NOTE — OCCUPATIONAL THERAPY NOTE
OCCUPATIONAL THERAPY TREATMENT NOTE - INPATIENT        Room Number: 537/537-A           Presenting Problem: acute pancreatitis    Problem List  Principal Problem:    Acute pancreatitis  Active Problems:    Essential hypertension    Other specified hypoth breath  Blood Pressure: 138/56    ACTIVITIES OF DAILY LIVING ASSESSMENT  AM-PAC ‘6-Clicks’ Inpatient Daily Activity Short Form  How much help from another person does the patient currently need…  -   Putting on and taking off regular lower body clothing?:

## 2018-03-27 NOTE — CM/SW NOTE
Ary/NP told SW that pt is cleared to discharge today and orders have been entered. SW notified Shirin Aaron from [facility] regarding orders and pt's discharge today. SW provided pt's family w/ resources for bedside commodes.  SW/CM to remain available for support

## 2018-03-27 NOTE — PHYSICAL THERAPY NOTE
PHYSICAL THERAPY TREATMENT NOTE - INPATIENT     Room Number: 537/537-A       Presenting Problem: pancreatis    Problem List  Principal Problem:    Acute pancreatitis  Active Problems:    Essential hypertension    Other specified hypothyroidism    Intracr assist with all mobility . Equipment recommendations with resources provided ;: w/c or transport chair for prolonged distance comm mobility . Also need for commode chair for main level .     Stair lift for home if pt unable to progress to stairs wi to sitting on the side of the bed?: A Little   How much help from another person does the patient currently need. ..   -   Moving to and from a bed to a chair (including a wheelchair)?: A Little   -   Need to walk in hospital room?: [address],[place] [address] assistance level: modified independent   Goal #3   Current Status     CGA to min assist for lines and pt steadying   Decrease tolerance    Goal #4 Patient will negotiate 5 stairs/one curb w/ assistive device and supervision   Goal #4   Current Status  mod

## 2018-03-27 NOTE — PLAN OF CARE
Problem: RESPIRATORY - ADULT  Goal: Achieves optimal ventilation and oxygenation  INTERVENTIONS:  - Assess for changes in respiratory status  - Assess for changes in mentation and behavior  - Position to facilitate oxygenation and minimize respiratory effo to electrolyte replacements, including rhythm and repeat lab results as appropriate  - Fluid restriction as ordered  - Instruct patient on fluid and nutrition restrictions as appropriate   Outcome: Progressing      Problem: SKIN/TISSUE INTEGRITY - ADULT  G FALL  Goal: Free from fall injury  INTERVENTIONS:  - Assess pt frequently for physical needs  - Identify cognitive and physical deficits and behaviors that affect risk of falls.   - Independence fall precautions as indicated by assessment.  - Educate pt/family

## 2018-03-27 NOTE — PLAN OF CARE
Problem: RESPIRATORY - ADULT  Goal: Achieves optimal ventilation and oxygenation  INTERVENTIONS:  - Assess for changes in respiratory status  - Assess for changes in mentation and behavior  - Position to facilitate oxygenation and minimize respiratory effo contributing to decreased intake, treat as appropriate  - Assist with meals as needed  - Monitor I&O, WT and lab values  - Obtain nutritional consult as needed  - Optimize oral hygiene and moisture  - Encourage food from home; allow for food preferences  - safety.     Problem: NEUROLOGICAL - ADULT  Goal: Achieves stable or improved neurological status  INTERVENTIONS  - Assess for and report changes in neurological status  - Initiate measures to prevent increased intracranial pressure  - Maintain blood pressur patient and family knowledge, values, beliefs, and cultural backgrounds into the planning and delivery of care  - Encourage patient/family to participate in care and decision-making at the level they choose  - Honor patient and family perspectives and rueda

## 2018-03-28 NOTE — PAYOR COMM NOTE
--------------  DISCHARGE REVIEW    Secondary Payor: N/A  Subscriber #:    Secondary Payor Authorization Number: N/A      Admit date: 3/14/18  Admit time:  4305  Discharge Date: 3/27/2018  3:17 PM     Admitting Physician: Luna Shah MD  Attending Physi hemorrhage with foci of prior hemorrhage, AC held, keppra started due abnl EEG who now presents with abdominal pain radiating to back and shoulders, decreased po intake. Pt found to have Acute pancreatitis     Hx obtained via pt and daughter Hema Dodson at [facility]. dilation.  Some possible peripancreatic stranding to proximal to mid body of pancrease  -3/15/18 HIDA Scan negative  -, does not drink ETOH, only relatively new medication includes keppra after recent hemorrhagic stroke, does have <1% risk of pancreat 1 DD, mild AI  -?  Hx of afib, but none noted on tele last admission  -3/22/18 CT Brain- atrophy, white matter changes, previous small left frontal subcortical hemorrhage has resolved, mild chronic residual laminar necrosis with chronic post infarct sequela collection along the ventral/inferior margin of the pancreatic head, most likely reflecting a developing pseudocyst. 3. Small volume free fluid in the pelvis, new since prior. 4. Colonic diverticulosis. 5. Large hiatal hernia.  6. Extensive coronary and per SURGERY, GENERAL  Contact information:  [address]  [address] [facility]  Brooke Mustafa Itzel Aj MD On 4/17/2018.     Specialties:  Radiology, Radiology, Diagnostic, INTERVENTIONAL, RADIOLOGY  Why:  to see at 6 am with recent admission to [place] on 1/24/18 with acute CVA with mild intracranial hemorrhage with foci of prior hemorrhage, AC held, keppra started due abnl EEG who now presents with abdominal pain radiating to back and shoulders, decreased po intake.  Pt collection, perihepatic ascites  -3/22 S/P drains- one in GB, other in abscess next to GB.  Cultures negative however pt has been on zoysn since admission  -3/26 CT A/P- decrease in abscess, resolving pancreatitis, possible new pseudocyst  -abscess drain pu with neuro here, thought DVT prophy at this point is ok as CT head improved  -plans to follow up with Neurology at [facility]     Abnormal EEG  -EEG mild but congruent with area of abnormality on imaging  -keppra 250 BID  -pt no longer drives     HTN   - cont nor

## 2018-04-03 ENCOUNTER — OFFICE VISIT (OUTPATIENT)
Dept: INTERVENTIONAL RADIOLOGY/VASCULAR | Facility: HOSPITAL | Age: 83
End: 2018-04-03
Attending: CLINICAL NURSE SPECIALIST
Payer: MEDICARE

## 2018-04-03 DIAGNOSIS — K81.9 CHOLECYSTITIS: Primary | ICD-10-CM

## 2018-04-03 RX ORDER — 0.9 % SODIUM CHLORIDE 0.9 %
10 VIAL (ML) INJECTION
Status: DISCONTINUED | OUTPATIENT
Start: 2018-04-04 | End: 2019-05-24 | Stop reason: ALTCHOICE

## 2018-04-05 NOTE — PLAN OF CARE
Problem: SKIN/TISSUE INTEGRITY - ADULT  Goal: Skin integrity remains intact  INTERVENTIONS  - Assess and document risk factors for pressure ulcer development  - Assess and document skin integrity  - Monitor for areas of redness and/or skin breakdown  - Ini Primary Defect Length In Cm (Final Defect Size - Required For Flaps/Grafts): 1.3

## 2018-04-09 ENCOUNTER — LAB REQUISITION (OUTPATIENT)
Dept: LAB | Facility: HOSPITAL | Age: 83
End: 2018-04-09
Payer: MEDICARE

## 2018-04-09 DIAGNOSIS — K85.90 ACUTE PANCREATITIS WITHOUT INFECTION OR NECROSIS: ICD-10-CM

## 2018-04-09 PROCEDURE — 85025 COMPLETE CBC W/AUTO DIFF WBC: CPT | Performed by: SURGERY

## 2018-04-12 PROBLEM — K81.0 ACUTE ACALCULOUS CHOLECYSTITIS: Status: ACTIVE | Noted: 2018-04-12

## 2018-04-17 ENCOUNTER — HOSPITAL ENCOUNTER (OUTPATIENT)
Dept: CT IMAGING | Facility: HOSPITAL | Age: 83
Discharge: HOME | End: 2018-04-17
Attending: SURGERY
Payer: MEDICARE

## 2018-04-17 ENCOUNTER — HOSPITAL ENCOUNTER (OUTPATIENT)
Dept: INTERVENTIONAL RADIOLOGY/VASCULAR | Facility: HOSPITAL | Age: 83
Discharge: HOME | End: 2018-04-17
Attending: SURGERY | Admitting: SURGERY
Payer: MEDICARE

## 2018-04-17 VITALS
HEART RATE: 77 BPM | SYSTOLIC BLOOD PRESSURE: 145 MMHG | OXYGEN SATURATION: 95 % | RESPIRATION RATE: 16 BRPM | DIASTOLIC BLOOD PRESSURE: 75 MMHG

## 2018-04-17 PROCEDURE — 74176 CT ABD & PELVIS W/O CONTRAST: CPT | Performed by: SURGERY

## 2018-04-17 PROCEDURE — BF121ZZ FLUOROSCOPY OF GALLBLADDER USING LOW OSMOLAR CONTRAST: ICD-10-PCS | Performed by: RADIOLOGY

## 2018-04-17 PROCEDURE — 47531 INJECTION FOR CHOLANGIOGRAM: CPT

## 2018-04-17 NOTE — PROGRESS NOTES
Pt received no sedation. RUQ drain was clamped. Dressing was changed to keep it clean and dry. Instructions provided and family verbalized understanding. CT scan was done as ordered as an outpatient. Pt stable for discharge.

## 2018-04-26 ENCOUNTER — LAB REQUISITION (OUTPATIENT)
Dept: LAB | Facility: HOSPITAL | Age: 83
End: 2018-04-26
Payer: MEDICARE

## 2018-04-26 DIAGNOSIS — N39.0 URINARY TRACT INFECTION: ICD-10-CM

## 2018-04-26 PROCEDURE — 81001 URINALYSIS AUTO W/SCOPE: CPT | Performed by: INTERNAL MEDICINE

## 2018-04-26 PROCEDURE — 87088 URINE BACTERIA CULTURE: CPT | Performed by: INTERNAL MEDICINE

## 2018-04-26 PROCEDURE — 87186 SC STD MICRODIL/AGAR DIL: CPT | Performed by: INTERNAL MEDICINE

## 2018-04-26 PROCEDURE — 87086 URINE CULTURE/COLONY COUNT: CPT | Performed by: INTERNAL MEDICINE

## 2018-04-26 NOTE — PROGRESS NOTES
Urine shows small blood, normal amount of white blood cells and large numbers of calcium oxalate crystals. I do not believe a workup for the small blood needs to be done with the crystals. There is nothing to suggest an infection.

## 2018-04-27 NOTE — PROGRESS NOTES
Urine culture does show bacteria but a low amount which is more consistent when taken with the urinalysis with colonization than infection. If developing fever will need to repeat urine.

## 2018-05-01 NOTE — PROGRESS NOTES
Telephone Information:  Home Phone      [phone]  Work Phone      Not on file. Mobile          [phone]-JEANETH and spoke with patient and advised her of your notes/instructions.  Patient states she is hard of hearing and if we will please call her

## 2018-05-01 NOTE — PROGRESS NOTES
Telephone Information:  Home Phone      [phone] -- Erin/ daughter (FYI consent) answered  Work Phone      Not on file.   Mobile          [phone] -- rang several times and went to busy signal    Yung Vogel confirmed message received and had s/w nursing

## 2018-05-08 ENCOUNTER — HOSPITAL ENCOUNTER (OUTPATIENT)
Dept: INTERVENTIONAL RADIOLOGY/VASCULAR | Facility: HOSPITAL | Age: 83
Discharge: HOME | End: 2018-05-08
Attending: RADIOLOGY | Admitting: RADIOLOGY
Payer: MEDICARE

## 2018-05-08 VITALS
OXYGEN SATURATION: 95 % | SYSTOLIC BLOOD PRESSURE: 154 MMHG | BODY MASS INDEX: 23.95 KG/M2 | HEIGHT: 57 IN | HEART RATE: 67 BPM | RESPIRATION RATE: 14 BRPM | WEIGHT: 111 LBS | DIASTOLIC BLOOD PRESSURE: 68 MMHG

## 2018-05-08 PROCEDURE — 47531 INJECTION FOR CHOLANGIOGRAM: CPT

## 2018-05-08 PROCEDURE — BF031ZZ PLAIN RADIOGRAPHY OF GALLBLADDER AND BILE DUCTS USING LOW OSMOLAR CONTRAST: ICD-10-PCS | Performed by: RADIOLOGY

## 2018-05-08 PROCEDURE — 0F943ZZ DRAINAGE OF GALLBLADDER, PERCUTANEOUS APPROACH: ICD-10-PCS | Performed by: RADIOLOGY

## 2018-05-08 RX ORDER — LIDOCAINE HYDROCHLORIDE 20 MG/ML
INJECTION, SOLUTION EPIDURAL; INFILTRATION; INTRACAUDAL; PERINEURAL
Status: COMPLETED
Start: 2018-05-08 | End: 2018-05-08

## 2018-05-08 RX ORDER — SODIUM CHLORIDE 9 MG/ML
INJECTION, SOLUTION INTRAVENOUS
Status: COMPLETED
Start: 2018-05-08 | End: 2018-05-08

## 2018-05-08 RX ORDER — CEFAZOLIN SODIUM/WATER 2 G/20 ML
SYRINGE (ML) INTRAVENOUS
Status: COMPLETED
Start: 2018-05-08 | End: 2018-05-08

## 2018-05-08 RX ORDER — SODIUM CHLORIDE 9 MG/ML
INJECTION, SOLUTION INTRAVENOUS ONCE
Status: DISCONTINUED | OUTPATIENT
Start: 2018-05-08 | End: 2018-05-08

## 2018-07-25 ENCOUNTER — LAB ENCOUNTER (OUTPATIENT)
Dept: LAB | Age: 83
End: 2018-07-25
Attending: INTERNAL MEDICINE
Payer: MEDICARE

## 2018-07-25 DIAGNOSIS — E03.8 HYPOTHYROIDISM DUE TO FIBROUS INVASIVE THYROIDITIS: Primary | ICD-10-CM

## 2018-07-25 DIAGNOSIS — E06.5 HYPOTHYROIDISM DUE TO FIBROUS INVASIVE THYROIDITIS: Primary | ICD-10-CM

## 2018-07-25 PROCEDURE — 84443 ASSAY THYROID STIM HORMONE: CPT

## 2018-07-26 LAB — TSI SER-ACNC: 6.75 MIU/ML (ref 0.35–5.5)

## 2018-07-27 NOTE — PROGRESS NOTES
Spoke to caregiver Kandis Paradaheide  Pt gave consent  Pt was off thyroid medication 3 days the week before test was done due to delay in getting a refill  MD please advise if you would like pt to take medication as directed and re-check TSH in 1 week  Lab pended for

## 2018-07-27 NOTE — PROGRESS NOTES
Tsh is now elevated. Make sure patient is taking her thyroid medicine first thing in the morning with water and not having any other food or medicines within 30 minutes. If she is not she needs to take her thyroid medicine this way.   If she has been [place]

## 2018-07-27 NOTE — PROGRESS NOTES
Lets recheck in 1 month. Suspect she would have had to have missed more than 3 doses to be off this much.

## 2018-07-29 NOTE — PROGRESS NOTES
Spoke with nesha Cronin MD message  States that she missed about a week   Will have her return to for recheck   No future appointments.

## 2018-08-10 PROCEDURE — 87102 FUNGUS ISOLATION CULTURE: CPT | Performed by: INTERNAL MEDICINE

## 2018-08-10 PROCEDURE — 81001 URINALYSIS AUTO W/SCOPE: CPT | Performed by: INTERNAL MEDICINE

## 2018-08-10 PROCEDURE — 87086 URINE CULTURE/COLONY COUNT: CPT | Performed by: INTERNAL MEDICINE

## 2018-08-10 PROCEDURE — 87186 SC STD MICRODIL/AGAR DIL: CPT | Performed by: INTERNAL MEDICINE

## 2018-08-10 PROCEDURE — 87088 URINE BACTERIA CULTURE: CPT | Performed by: INTERNAL MEDICINE

## 2018-08-10 PROCEDURE — 87077 CULTURE AEROBIC IDENTIFY: CPT | Performed by: INTERNAL MEDICINE

## 2018-09-28 PROCEDURE — 87086 URINE CULTURE/COLONY COUNT: CPT | Performed by: INTERNAL MEDICINE

## 2018-09-28 PROCEDURE — 81001 URINALYSIS AUTO W/SCOPE: CPT | Performed by: INTERNAL MEDICINE

## 2019-01-01 ENCOUNTER — APPOINTMENT (OUTPATIENT)
Dept: CT IMAGING | Facility: HOSPITAL | Age: 84
DRG: 069 | End: 2019-01-01
Attending: EMERGENCY MEDICINE
Payer: MEDICARE

## 2019-01-01 ENCOUNTER — APPOINTMENT (OUTPATIENT)
Dept: ULTRASOUND IMAGING | Facility: HOSPITAL | Age: 84
DRG: 069 | End: 2019-01-01
Attending: HOSPITALIST
Payer: MEDICARE

## 2019-01-01 ENCOUNTER — APPOINTMENT (OUTPATIENT)
Dept: GENERAL RADIOLOGY | Facility: HOSPITAL | Age: 84
DRG: 069 | End: 2019-01-01
Attending: EMERGENCY MEDICINE
Payer: MEDICARE

## 2019-01-01 ENCOUNTER — HOSPITAL ENCOUNTER (INPATIENT)
Facility: HOSPITAL | Age: 84
LOS: 1 days | Discharge: HOME | DRG: 069 | End: 2019-01-01
Attending: EMERGENCY MEDICINE | Admitting: HOSPITALIST
Payer: MEDICARE

## 2019-01-01 ENCOUNTER — LAB REQUISITION (OUTPATIENT)
Dept: LAB | Facility: HOSPITAL | Age: 84
End: 2019-01-01
Payer: MEDICARE

## 2019-01-01 ENCOUNTER — APPOINTMENT (OUTPATIENT)
Dept: MRI IMAGING | Facility: HOSPITAL | Age: 84
DRG: 069 | End: 2019-01-01
Attending: HOSPITALIST
Payer: MEDICARE

## 2019-01-01 VITALS
RESPIRATION RATE: 18 BRPM | TEMPERATURE: 98 F | OXYGEN SATURATION: 92 % | HEIGHT: 57 IN | WEIGHT: 109.69 LBS | DIASTOLIC BLOOD PRESSURE: 58 MMHG | HEART RATE: 65 BPM | BODY MASS INDEX: 23.66 KG/M2 | SYSTOLIC BLOOD PRESSURE: 128 MMHG

## 2019-01-01 DIAGNOSIS — I63.9 CEREBROVASCULAR ACCIDENT (CVA), UNSPECIFIED MECHANISM (HCC): Primary | ICD-10-CM

## 2019-01-01 DIAGNOSIS — N39.0 URINARY TRACT INFECTION: ICD-10-CM

## 2019-01-01 LAB
BILIRUB UR QL: NEGATIVE
COLOR UR: YELLOW
GLUCOSE UR-MCNC: NEGATIVE MG/DL
HYALINE CASTS #/AREA URNS AUTO: 2 /LPF
KETONES UR-MCNC: NEGATIVE MG/DL
NITRITE UR QL STRIP.AUTO: POSITIVE
PH UR: 5 [PH] (ref 5–8)
PROT UR-MCNC: NEGATIVE MG/DL
RBC #/AREA URNS AUTO: 7 /HPF
SP GR UR STRIP: 1.02 (ref 1–1.03)
UROBILINOGEN UR STRIP-ACNC: <2
VIT C UR-MCNC: NEGATIVE MG/DL
WBC #/AREA URNS AUTO: 158 /HPF

## 2019-01-01 PROCEDURE — 70450 CT HEAD/BRAIN W/O DYE: CPT | Performed by: EMERGENCY MEDICINE

## 2019-01-01 PROCEDURE — 87186 SC STD MICRODIL/AGAR DIL: CPT | Performed by: INTERNAL MEDICINE

## 2019-01-01 PROCEDURE — 71045 X-RAY EXAM CHEST 1 VIEW: CPT | Performed by: EMERGENCY MEDICINE

## 2019-01-01 PROCEDURE — 87086 URINE CULTURE/COLONY COUNT: CPT | Performed by: INTERNAL MEDICINE

## 2019-01-01 PROCEDURE — 87088 URINE BACTERIA CULTURE: CPT | Performed by: INTERNAL MEDICINE

## 2019-01-01 PROCEDURE — 87077 CULTURE AEROBIC IDENTIFY: CPT | Performed by: INTERNAL MEDICINE

## 2019-01-01 PROCEDURE — 99222 1ST HOSP IP/OBS MODERATE 55: CPT | Performed by: OTHER

## 2019-01-01 PROCEDURE — 93880 EXTRACRANIAL BILAT STUDY: CPT | Performed by: HOSPITALIST

## 2019-01-01 PROCEDURE — 81001 URINALYSIS AUTO W/SCOPE: CPT | Performed by: INTERNAL MEDICINE

## 2019-01-01 RX ORDER — AMLODIPINE BESYLATE 5 MG/1
5 TABLET ORAL DAILY
Status: DISCONTINUED | OUTPATIENT
Start: 2019-01-01 | End: 2019-01-01

## 2019-01-01 RX ORDER — SODIUM CHLORIDE 9 MG/ML
INJECTION, SOLUTION INTRAVENOUS CONTINUOUS
Status: DISCONTINUED | OUTPATIENT
Start: 2019-01-01 | End: 2019-01-01

## 2019-01-01 RX ORDER — PAROXETINE 10 MG/1
5 TABLET, FILM COATED ORAL EVERY MORNING
Status: DISCONTINUED | OUTPATIENT
Start: 2019-01-01 | End: 2019-01-01

## 2019-01-01 RX ORDER — ACETAMINOPHEN 325 MG/1
650 TABLET ORAL EVERY 6 HOURS PRN
Status: DISCONTINUED | OUTPATIENT
Start: 2019-01-01 | End: 2019-01-01

## 2019-01-01 RX ORDER — ENOXAPARIN SODIUM 100 MG/ML
40 INJECTION SUBCUTANEOUS DAILY
Status: DISCONTINUED | OUTPATIENT
Start: 2019-01-01 | End: 2019-01-01

## 2019-01-01 RX ORDER — HEPARIN SODIUM 5000 [USP'U]/ML
5000 INJECTION, SOLUTION INTRAVENOUS; SUBCUTANEOUS EVERY 8 HOURS SCHEDULED
Status: DISCONTINUED | OUTPATIENT
Start: 2019-01-01 | End: 2019-01-01

## 2019-01-01 RX ORDER — LEVETIRACETAM 250 MG/1
250 TABLET ORAL 2 TIMES DAILY
Status: DISCONTINUED | OUTPATIENT
Start: 2019-01-01 | End: 2019-01-01

## 2019-01-01 RX ORDER — SODIUM CHLORIDE 0.9 % (FLUSH) 0.9 %
3 SYRINGE (ML) INJECTION AS NEEDED
Status: DISCONTINUED | OUTPATIENT
Start: 2019-01-01 | End: 2019-01-01

## 2019-01-01 RX ORDER — POTASSIUM CHLORIDE 20 MEQ/1
40 TABLET, EXTENDED RELEASE ORAL ONCE
Status: COMPLETED | OUTPATIENT
Start: 2019-01-01 | End: 2019-01-01

## 2019-01-01 RX ORDER — LEVOTHYROXINE SODIUM 0.05 MG/1
50 TABLET ORAL
Status: DISCONTINUED | OUTPATIENT
Start: 2019-01-01 | End: 2019-01-01

## 2019-01-01 RX ORDER — PAROXETINE 10 MG/1
5 TABLET, FILM COATED ORAL EVERY MORNING
COMMUNITY
End: 2020-01-01

## 2019-01-01 RX ORDER — MAGNESIUM OXIDE 400 MG (241.3 MG MAGNESIUM) TABLET
3 TABLET NIGHTLY PRN
Status: DISCONTINUED | OUTPATIENT
Start: 2019-01-01 | End: 2019-01-01

## 2019-01-01 RX ORDER — ATORVASTATIN CALCIUM 10 MG/1
10 TABLET, FILM COATED ORAL NIGHTLY
Status: DISCONTINUED | OUTPATIENT
Start: 2019-01-01 | End: 2019-01-01

## 2019-04-03 PROBLEM — K81.0 ACUTE ACALCULOUS CHOLECYSTITIS: Status: RESOLVED | Noted: 2018-04-12 | Resolved: 2019-04-03

## 2019-04-03 PROBLEM — D72.829 LEUKOCYTOSIS, UNSPECIFIED TYPE: Status: RESOLVED | Noted: 2018-03-14 | Resolved: 2019-04-03

## 2019-04-03 PROBLEM — I62.9 INTRACRANIAL HEMORRHAGE (HCC): Status: RESOLVED | Noted: 2018-01-22 | Resolved: 2019-04-03

## 2019-04-03 PROBLEM — S22.000A CLOSED COMPRESSION FRACTURE OF THORACIC VERTEBRA, INITIAL ENCOUNTER (HCC): Status: RESOLVED | Noted: 2018-03-14 | Resolved: 2019-04-03

## 2019-04-03 PROBLEM — K85.90 ACUTE PANCREATITIS: Status: RESOLVED | Noted: 2018-03-14 | Resolved: 2019-04-03

## 2019-05-02 ENCOUNTER — HOSPITAL ENCOUNTER (EMERGENCY)
Facility: HOSPITAL | Age: 84
Discharge: HOME | End: 2019-05-02
Attending: EMERGENCY MEDICINE
Payer: MEDICARE

## 2019-05-02 ENCOUNTER — APPOINTMENT (OUTPATIENT)
Dept: CT IMAGING | Facility: HOSPITAL | Age: 84
End: 2019-05-02
Attending: EMERGENCY MEDICINE
Payer: MEDICARE

## 2019-05-02 VITALS
SYSTOLIC BLOOD PRESSURE: 145 MMHG | TEMPERATURE: 99 F | DIASTOLIC BLOOD PRESSURE: 63 MMHG | OXYGEN SATURATION: 96 % | HEIGHT: 58 IN | HEART RATE: 66 BPM | RESPIRATION RATE: 15 BRPM | WEIGHT: 126 LBS | BODY MASS INDEX: 26.45 KG/M2

## 2019-05-02 DIAGNOSIS — K58.2 IRRITABLE BOWEL SYNDROME WITH BOTH CONSTIPATION AND DIARRHEA: Primary | ICD-10-CM

## 2019-05-02 PROCEDURE — 96361 HYDRATE IV INFUSION ADD-ON: CPT

## 2019-05-02 PROCEDURE — 80048 BASIC METABOLIC PNL TOTAL CA: CPT | Performed by: EMERGENCY MEDICINE

## 2019-05-02 PROCEDURE — 74176 CT ABD & PELVIS W/O CONTRAST: CPT | Performed by: EMERGENCY MEDICINE

## 2019-05-02 PROCEDURE — 85025 COMPLETE CBC W/AUTO DIFF WBC: CPT | Performed by: EMERGENCY MEDICINE

## 2019-05-02 PROCEDURE — 96360 HYDRATION IV INFUSION INIT: CPT

## 2019-05-02 PROCEDURE — 99284 EMERGENCY DEPT VISIT MOD MDM: CPT

## 2019-05-02 NOTE — ED INITIAL ASSESSMENT (HPI)
Patient arrives from Ems and c/o of diarrhea for 2-3 days, she was constipated before and now having loose stools, denies blood in stool, denies abdominal pain. Patient called MD and MD wanted her to come in to be assesed for dehydration.

## 2019-05-03 NOTE — ED PROVIDER NOTES
Patient Seen in: [facility] Emergency Department    History   Patient presents with:  Nausea/Vomiting/Diarrhea (gastrointestinal)    Stated Complaint:     HPI    Patient is a 27-year-old female who presents to the emergency department with a chief are normal.   Neck: Neck supple. Cardiovascular: Normal rate, regular rhythm and normal heart sounds. Pulmonary/Chest: Effort normal.   Abdominal: There is tenderness in the right upper quadrant.  There is no rigidity, no rebound, no guarding and no CVA MD  [address]    Schedule an appointment as soon as possible for a visit      We recommend that you schedule follow up care with a primary care provider within the next three months to obtain basic he

## 2019-10-21 PROBLEM — I63.9 CEREBROVASCULAR ACCIDENT (CVA), UNSPECIFIED MECHANISM (HCC): Status: ACTIVE | Noted: 2019-01-01

## 2019-10-21 NOTE — PROGRESS NOTES
Responded to stroke alert and offered support and hospitality to patient as well as her caretaker and daughter. Patient's daughter expressed concerns to  and caretaker regarding decisions about the plan of care.  Follow up from spiritual care could

## 2019-10-21 NOTE — ED PROVIDER NOTES
Patient Seen in: [facility] Emergency Department      History   Patient presents with:  Stroke (neurologic)    Stated Complaint: Unknown last normla- between 10-12 noon- pt noted to have left-sided weakness a*    HPI    80year-old female patient °C)   Temp src Oral   SpO2 96 %   O2 Device None (Room air)       Current:/58 (BP Location: Right arm)   Pulse 65   Temp 97.9 °F (36.6 °C) (Oral)   Resp 18   Ht 144.8 cm (4' 9\")   Wt 49.8 kg   SpO2 92%   BMI 23.74 kg/m²         Physical Exam    Gen: normal limits   POCT GLUCOSE - Abnormal; Notable for the following components:    POC Glucose  108 (*)     All other components within normal limits   CBC W/ DIFFERENTIAL - Abnormal; Notable for the following components:    WBC 11.4 (*)     .0 (*) PM                   Present on Admission  Date Reviewed: 2/26/2018          ICD-10-CM Noted POA    * (Principal) Cerebrovascular accident (CVA), unspecified mechanism ([facility] I63.9 10/21/2019     Stroke ([facility] I63.9 2/26/2018 Unknown    Overview Signed 2/26/

## 2019-10-21 NOTE — ED NOTES
The patient is here following a stroke alert called by EMS for left side weakness first noted today at 1150 am when the patient woke from sleep and was ambulating with her walker when the caregiver noticed that the patient was leaning to the left more than

## 2019-10-22 PROBLEM — G45.9 TIA (TRANSIENT ISCHEMIC ATTACK): Status: ACTIVE | Noted: 2019-01-01

## 2019-10-22 NOTE — H&P
[facility] Hospitalist H&P     CC: Patient presents with:  Stroke (neurologic)       PCP: Anthony Marroquin MD      Assessment and Plan     Ms. Martinez Faulkner is a 81 y/o F w/ HTN, [place], ?afib, HL, previous TIA and on 1/24/18 with acute CVA with mild intracranial hemorrh baseline status.     -head CT with no acute findings    CKD stage 3  -[facility], follow with PCP    HTN  -cont home meds    Hypothyroidism cont home meds    Dispo; d/c home with 24 hour care and [facility]    Outpatient records reviewed confirming patient's medica HYPERTENSION    • HYPOTHYROIDISM    • Incontinence of urine    • STROKE     transient ischemic attacks   • Stroke [facility])    • TIA (transient ischemic attack)    • Unspecified essential hypertension         PSH  Past Surgical History:   Procedure Laterality Data:    CBC/Chem  Recent Labs   Lab 10/21/19  1622 10/22/19  0451   WBC 11.4* 9.1   HGB 13.5 12.2   .0* 99.7   .0 222.0   INR 0.95  --        Recent Labs   Lab 10/21/19  1622 10/22/19  0451    140   K 3.9 3.8    104   CO2 30.0 33 Kushal Pete MD on 10/21/2019 at 17:00          Ct Stroke Brain (no Iv)(cpt=70450)    Result Date: 10/21/2019  PROCEDURE: CT STROKE BRAIN (NO IV) (CPT=70450)  COMPARISON: [facility], CT BRAIN OR HEAD (CPT=70450), 3/22/2018, 14:44.   INDICATIONS: L

## 2019-10-22 NOTE — CM/SW NOTE
MD order received regarding POLST. POLST form is scanned into Epic.      [facility] [place] ext [phone]

## 2019-10-22 NOTE — SLP NOTE
ADULT SWALLOWING EVALUATION    ASSESSMENT    ASSESSMENT/OVERALL IMPRESSION:  Pt seen sitting upright in bed for all PO trials and evaluation. Pt's family at bedside and reported no chronic dysphagia in home environment prior to this admission.  Pt with some Recommendations: Whole in puree  Treatment Plan/Recommendations: Aspiration precautions  Discharge Recommendations/Plan: Undetermined    HISTORY   MEDICAL HISTORY  Reason for Referral: R/O aspiration    Problem List  Principal Problem:    Cerebrovascular a intact  (Please note: Silent aspiration cannot be evaluated clinically.  Videofluoroscopic Swallow Study is required to rule-out silent aspiration.)    Esophageal Phase of Swallow: No complaints consistent with possible esophageal involvement    GOALS  Goal

## 2019-10-22 NOTE — PLAN OF CARE
Problem: Patient/Family Goals  Goal: Patient/Family Long Term Goal  Description  Patient's Long Term Goal:  go home    Interventions:  - medications as ordered  -testing as ordered  - See additional Care Plan goals for specific interventions  Outcome:  Ad patients to use assistive/communication devices  Outcome: Adequate for Discharge     Problem: SAFETY ADULT - FALL  Goal: Free from fall injury  Description  INTERVENTIONS:  - Assess pt frequently for physical needs  - Identify cognitive and physical defici

## 2019-10-22 NOTE — PROGRESS NOTES
[facility] Pharmacy Note:  Renal Dose Adjustment for Enoxaparin (LOVENOX)    Jeri Garcia has been prescribed Enoxaparin (LOVENOX) 40 mg subcutaneously every 24 hours. Estimated Creatinine Clearance: 20.2 mL/min (A) (based on SCr of 1.28 mg/dL (H)).     Her mat

## 2019-10-22 NOTE — CONSULTS
Neurology Inpatient Consult Note    Jeri Garcia : 1923   Referring Physician: Dr. Monse Koenig  HPI:     Jeri Garcia is a 80year old female who is being seen in neurologic evaluation.     Pt brought to ED after aides at living facility noted her s Alcohol use: No      Drug use: No        ROS:   Unable to obtain from patient    EXAM:     Vitals:  /58 (BP Location: Right arm)   Pulse 65   Temp 97.9 °F (36.6 °C) (Oral)   Resp 18   Ht 57\"   Wt 109 lb 11.2 oz (49.8 kg)   SpO2 92%   BMI 23.74 kg/m² hemorrhage.       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

## 2019-10-22 NOTE — DISCHARGE SUMMARY
[facility] Internal Medicine Discharge Summary   Patient ID:  Ramana Garcia  [id number]  89 year old  9/16/1923    Admit date: 10/21/2019    Discharge date and time: 10/22/2019 12:29 PM     Attending Physician: No att. providers found     Primary Care Physician: Sharri Concepcion and her current neuro status is baseline. No facial droop noted, strength symmetrical.  Activity limited by severe b/l knee OA. Pt has no complaints. Seen by neurology and ok for home. D/W Dtr and they would not like to start any AC or statin.   They gómez Date: 10/21/2019  CONCLUSION: 1. Little change from January 22, 2018. 2. Borderline cardiomegaly. 3. Atherosclerosis. 4. Hyperinflation. 5. Scarring/atelectasis. 6. Scoliosis. 7. Demineralization. 8. Osteoarthritis. 9. Old fracture left humerus.  10. Right

## 2019-10-23 NOTE — PAYOR COMM NOTE
--------------  ADMISSION REVIEW     Payor: [facility] [place] #:  [id number]  Authorization Number: [id number]    ED Provider Notes      Patient Seen in: [facility] Emergency Department      History   Patient presents with:  Julia Houston °C)   Temp src Oral   SpO2 96 %   O2 Device None (Room air)       Current:/58 (BP Location: Right arm)   Pulse 65   Temp 97.9 °F (36.6 °C) (Oral)   Resp 18   Ht 144.8 cm (4' 9\")   Wt 49.8 kg   SpO2 92%   BMI 23.74 kg/m²          Physical Exam    Gen normal limits   POCT GLUCOSE - Abnormal; Notable for the following components:    POC Glucose  108 (*)     All other components within normal limits   CBC W/ DIFFERENTIAL - Abnormal; Notable for the following components:    WBC 11.4 (*)     .0 (*) droop noted, strength symmetrical.  Activity limited by severe b/l knee OA. Pt has no complaints. Seen by neurology and ok for home. D/W Dtr and they would not like to start any AC or statin.   They will discuss with their PCP making her more palliative a [place], ?afib, HL, previous TIA and on 1/24/18 with acute CVA with mild intracranial hemorrhage with foci of prior hemorrhage, AC held, keppra started due abnl EEG, and acute pancreatitis 2/2 gallbladder perforation in 4/2018 treated with drain and abx who pr  140   K 3.9 3.8    104   CO2 30.0 33.0*   BUN 28* 24*   CREATSERUM 1.28* 1.14*   * 92   CA 9.7 9.0       No results for input(s): ALT, AST, ALB, AMYLASE, LIPASE, LDH in the last 168 hours.     Invalid input(s): ALPHOS, TBIL, DBIL, TPRO BRAIN OR HEAD (CPT=70450), 3/22/2018, 14:44. INDICATIONS: Left sided weakness, slurring words. Stroke alert. TECHNIQUE:   CT images were obtained without contrast material.  Automated exposure control for dose reduction was used.   Dose information is tra worsening neurologic symptoms, which has been the case with patient in the past.     –Avoid aspirin, statin due to amyloid angiopathy, risks outweigh benefits     –We will continue home dose Keppra 500 mg twice daily     –After extensive discussion with fa

## 2019-10-29 NOTE — PROGRESS NOTES
Called patient at [phone]  Gave verbal ok to give test results to caregiver  updated on provider notes, states understanding

## 2022-10-14 NOTE — PROGRESS NOTES
VA Greater Los Angeles Healthcare CenterD HOSP - Tri-City Medical Center  Progress Note    Carmen Page Patient Status:  Outpatient    1923 MRN [id number]   [place] [address] Attending Janice Soriano APN   Hosp Day # 0 PCP MD Ryan Chase

## (undated) NOTE — ED AVS SNAPSHOT
[facility] Emergency Department    [address]     [place] [address]    Phone:  [phone]    Fax:  [address]   MRN: [id number]    Department:  [facility] Emergency Department   Date of Visit:  1/16/20 It is our goal to assure that you are completely satisfied with every aspect of your visit today.   In an effort to constantly improve our service to you, we would appreciate any positive or negative feedback related to the care you received in our emergenc [employer] account. You may have had testing done that requires us to contact you. Please make sure we have your correct phone number on file.       I certified that I have received a copy of the aftercare instructions; that these instructions have been expl

## (undated) NOTE — ED AVS SNAPSHOT
[facility] Emergency Department    [address]     [place] [address]    Phone:  [phone]    Fax:  [address]   MRN: [id number]    Department:  [facility] Emergency Department   Date of Visit:  1/15/20 and Class Registration line at [phone] or find a doctor online by visiting [email or web address].    IF THERE IS ANY CHANGE OR WORSENING OF YOUR CONDITION, CALL YOUR PRIMARY CARE PHYSICIAN AT ONCE OR RETURN IMMEDIATELY TO [address] [place] [address].     If

## (undated) NOTE — Clinical Note
Leona Raygoza, [address] [facility]  Adrienne Boyd [address]  [address], [address]       01/20/2017        Patient: Makenzie Garcia   YOB: 1923   Date of Visit: 1/20/2017       Dear  Dr. Javy Jones MD,      Thank you for referring Makenzie Garcia to

## (undated) NOTE — ED AVS SNAPSHOT
[facility] Emergency Department    [address]     [place] [address]    Phone:  [phone]    Fax:  [address]   MRN: [id number]    Department:  [facility] Emergency Department   Date of Visit:  1/22/20 related to the care you received in our emergency department. Please call our [address] at [phone]. Your Emergency Department team is here to serve you. You are our top priority. You were examined and treated today on an urgent basis only.   Marah Browne that these instructions have been explained to me; all questions pertaining to these instructions have been answered in a satisfactory manner. 24-Hour Pharmacies        Pharmacy Address Phone Number   Kirill Wilkins [address] ([address] [address]

## (undated) NOTE — ED AVS SNAPSHOT
[facility] AND [facility] Immediate Care in [address] [address] Dar Kumar    Phone:  [phone]    Fax:  [address]   MRN: [id number]    Department:  [facility] AND [facility] in [address] [place] [address]   Date of Visit:  1/14 It is our goal to assure that you are completely satisfied with every aspect of your visit today.   In an effort to constantly improve our service to you, we would appreciate any positive or negative feedback related to the care you received in our [facility] account. You may have had testing done that requires us to contact you. Please make sure we have your correct phone number on file.      OUR CURRENT HOURS OF OPERATION:  MONDAY THROUGH FRIDAY 8AM - 8PM  WEEKENDS AND HOLIDAYS 8AM - 6PM    I certifi

## (undated) NOTE — ED AVS SNAPSHOT
[facility] Emergency Department    [address]     [place] [address]    Phone:  [phone]    Fax:  [address]   MRN: [id number]    Department:  [facility] Emergency Department   Date of Visit:  1/16/20 and Class Registration line at [phone] or find a doctor online by visiting [email or web address].    IF THERE IS ANY CHANGE OR WORSENING OF YOUR CONDITION, CALL YOUR PRIMARY CARE PHYSICIAN AT ONCE OR RETURN IMMEDIATELY TO [address] [place] [address].     If

## (undated) NOTE — ED AVS SNAPSHOT
Lou Pfeiffer   MRN: [id number]    Department:  [facility] Emergency Department   Date of Visit:  1/27/2018           Disclosure     Insurance plans vary and the physician(s) referred by the ER may not be covered by your plan.  Please contact you within the next three months to obtain basic health screening including reassessment of your blood pressure.     IF THERE IS ANY CHANGE OR WORSENING OF YOUR CONDITION, CALL YOUR PRIMARY CARE PHYSICIAN AT ONCE OR RETURN IMMEDIATELY TO THE EMERGENCY DEPARTMEN

## (undated) NOTE — MR AVS SNAPSHOT
[address]  [address]  [phone]               Thank you for choosing us for your health care visit with Conner Carey MD.  We are glad to serve you and happy to provide you with this summary Follow-up Instructions     Return if symptoms worsen or fail to improve.          Kelly                  Visit [facility] online at  [email or web address]

## (undated) NOTE — ED AVS SNAPSHOT
[facility] Emergency Department    [address]     [place] [address]    Phone:  [phone]    Fax:  [address]   MRN: [id number]    Department:  [facility] Emergency Department   Date of Visit:  1/15/20 Si tiene problemas para programar jong radha de seguimiento según lo indicado, llame al encargado de roberta al [phone]. It is our goal to assure that you are completely satisfied with every aspect of your visit today.   In an effort to constantly impr list to your next doctor's appointment. Any imaging studies and labs completed today can be reviewed in your MyChart account. You may have had testing done that requires us to contact you. Please make sure we have your correct phone number on file.

## (undated) NOTE — ED AVS SNAPSHOT
Claritza Montesinos   MRN: [id number]    Department:  [facility] Emergency Department   Date of Visit:  1/25/2018           Disclosure     Insurance plans vary and the physician(s) referred by the ER may not be covered by your plan.  Please contact you within the next three months to obtain basic health screening including reassessment of your blood pressure.     IF THERE IS ANY CHANGE OR WORSENING OF YOUR CONDITION, CALL YOUR PRIMARY CARE PHYSICIAN AT ONCE OR RETURN IMMEDIATELY TO THE EMERGENCY DEPARTMEN

## (undated) NOTE — ED AVS SNAPSHOT
[facility] Emergency Department    [address] [place] [address]    Phone:  [phone]    Fax:  [address]   MRN: [id number]    Department:  [facility] Emergency Department   Date of Visit:  1/22/20 and Class Registration line at [phone] or find a doctor online by visiting [email or web address].    IF THERE IS ANY CHANGE OR WORSENING OF YOUR CONDITION, CALL YOUR PRIMARY CARE PHYSICIAN AT ONCE OR RETURN IMMEDIATELY TO [address] [place] [address].     If

## (undated) NOTE — ED AVS SNAPSHOT
Brandon Santos   MRN: [id number]    Department:  [facility] Emergency Department   Date of Visit:  5/2/2019           Disclosure     Insurance plans vary and the physician(s) referred by the ER may not be covered by your plan.  Please contact your within the next three months to obtain basic health screening including reassessment of your blood pressure.     IF THERE IS ANY CHANGE OR WORSENING OF YOUR CONDITION, CALL YOUR PRIMARY CARE PHYSICIAN AT ONCE OR RETURN IMMEDIATELY TO THE EMERGENCY DEPARTMEN

## (undated) NOTE — LETTER
[address], [place]  Authorization for Invasive Procedures  1.  I hereby authorize Dr. Blanca Ponce , my physician and whomever may be designated as the doctor's assistant, to perform the following operation and/or procedure:  Cholangiogr performed for the purposes of advancing medicine, science, and/or education, provided my identity is not revealed. If the procedure has been videotaped, the physician/surgeon will obtain the original videotape.  The hospital will not be responsible for stor My signature below affirms that prior to the time of the procedure, I have explained to the patient and/or her legal representative, the risks and benefits involved in the proposed treatment and any reasonable alternative to the proposed treatment.  I have